# Patient Record
Sex: FEMALE | Race: WHITE
[De-identification: names, ages, dates, MRNs, and addresses within clinical notes are randomized per-mention and may not be internally consistent; named-entity substitution may affect disease eponyms.]

---

## 2017-10-31 ENCOUNTER — HOSPITAL ENCOUNTER (OUTPATIENT)
Dept: HOSPITAL 26 - MDS | Age: 67
Discharge: HOME | End: 2017-10-31
Attending: INTERNAL MEDICINE
Payer: COMMERCIAL

## 2017-10-31 VITALS — HEIGHT: 61 IN | WEIGHT: 134 LBS | BODY MASS INDEX: 25.3 KG/M2

## 2017-10-31 DIAGNOSIS — E07.9: ICD-10-CM

## 2017-10-31 DIAGNOSIS — Z79.899: ICD-10-CM

## 2017-10-31 DIAGNOSIS — Z98.890: ICD-10-CM

## 2017-10-31 DIAGNOSIS — E11.9: ICD-10-CM

## 2017-10-31 DIAGNOSIS — E66.3: ICD-10-CM

## 2017-10-31 DIAGNOSIS — K62.5: Primary | ICD-10-CM

## 2017-10-31 PROCEDURE — 45398 COLONOSCOPY W/BAND LIGATION: CPT

## 2017-10-31 PROCEDURE — 82948 REAGENT STRIP/BLOOD GLUCOSE: CPT

## 2018-10-08 ENCOUNTER — HOSPITAL ENCOUNTER (INPATIENT)
Dept: HOSPITAL 26 - MED | Age: 68
LOS: 3 days | Discharge: HOME | DRG: 393 | End: 2018-10-11
Attending: GENERAL PRACTICE | Admitting: GENERAL PRACTICE
Payer: COMMERCIAL

## 2018-10-08 VITALS — SYSTOLIC BLOOD PRESSURE: 170 MMHG | DIASTOLIC BLOOD PRESSURE: 59 MMHG

## 2018-10-08 VITALS — SYSTOLIC BLOOD PRESSURE: 165 MMHG | DIASTOLIC BLOOD PRESSURE: 65 MMHG

## 2018-10-08 VITALS — WEIGHT: 130 LBS | BODY MASS INDEX: 24.55 KG/M2 | HEIGHT: 61 IN

## 2018-10-08 DIAGNOSIS — Z90.49: ICD-10-CM

## 2018-10-08 DIAGNOSIS — N39.0: ICD-10-CM

## 2018-10-08 DIAGNOSIS — E83.51: ICD-10-CM

## 2018-10-08 DIAGNOSIS — N17.0: ICD-10-CM

## 2018-10-08 DIAGNOSIS — Z23: ICD-10-CM

## 2018-10-08 DIAGNOSIS — N18.6: ICD-10-CM

## 2018-10-08 DIAGNOSIS — D61.818: ICD-10-CM

## 2018-10-08 DIAGNOSIS — Z79.4: ICD-10-CM

## 2018-10-08 DIAGNOSIS — I50.43: ICD-10-CM

## 2018-10-08 DIAGNOSIS — Z82.49: ICD-10-CM

## 2018-10-08 DIAGNOSIS — Z79.899: ICD-10-CM

## 2018-10-08 DIAGNOSIS — Z87.891: ICD-10-CM

## 2018-10-08 DIAGNOSIS — K70.30: ICD-10-CM

## 2018-10-08 DIAGNOSIS — E03.9: ICD-10-CM

## 2018-10-08 DIAGNOSIS — I13.2: ICD-10-CM

## 2018-10-08 DIAGNOSIS — Z83.3: ICD-10-CM

## 2018-10-08 DIAGNOSIS — K64.9: Primary | ICD-10-CM

## 2018-10-08 DIAGNOSIS — E11.22: ICD-10-CM

## 2018-10-08 DIAGNOSIS — M81.0: ICD-10-CM

## 2018-10-08 DIAGNOSIS — K21.9: ICD-10-CM

## 2018-10-08 DIAGNOSIS — Z99.2: ICD-10-CM

## 2018-10-08 DIAGNOSIS — E83.39: ICD-10-CM

## 2018-10-08 DIAGNOSIS — K76.6: ICD-10-CM

## 2018-10-08 DIAGNOSIS — E11.65: ICD-10-CM

## 2018-10-08 LAB
ALBUMIN FLD-MCNC: 3.4 G/DL (ref 3.4–5)
AMYLASE SERPL-CCNC: 80 U/L (ref 25–115)
ANION GAP SERPL CALCULATED.3IONS-SCNC: 10.8 MMOL/L (ref 8–16)
AST SERPL-CCNC: 27 U/L (ref 15–37)
BASOPHILS # BLD AUTO: 0 K/UL (ref 0–0.22)
BASOPHILS NFR BLD AUTO: 1 % (ref 0–2)
BILIRUB SERPL-MCNC: 0.5 MG/DL (ref 0–1)
BUN SERPL-MCNC: 45 MG/DL (ref 7–18)
CHLORIDE SERPL-SCNC: 102 MMOL/L (ref 98–107)
CHOLEST/HDLC SERPL: 2.9 {RATIO} (ref 1–4.5)
CO2 SERPL-SCNC: 28.2 MMOL/L (ref 21–32)
CREAT SERPL-MCNC: 5.8 MG/DL (ref 0.6–1.3)
EOSINOPHIL # BLD AUTO: 0.1 K/UL (ref 0–0.4)
EOSINOPHIL NFR BLD AUTO: 5.3 % (ref 0–4)
ERYTHROCYTE [DISTWIDTH] IN BLOOD BY AUTOMATED COUNT: 18.6 % (ref 11.6–13.7)
GFR SERPL CREATININE-BSD FRML MDRD: 9 ML/MIN (ref 90–?)
GLUCOSE SERPL-MCNC: 313 MG/DL (ref 74–106)
HCT VFR BLD AUTO: 23.9 % (ref 36–48)
HDLC SERPL-MCNC: 60 MG/DL (ref 40–60)
HGB BLD-MCNC: 7.6 G/DL (ref 12–16)
LDH SERPL-CCNC: 224 U/L (ref 81–234)
LDLC SERPL CALC-MCNC: 95 MG/DL (ref 60–100)
LIPASE SERPL-CCNC: 240 U/L (ref 73–393)
LYMPHOCYTES # BLD AUTO: 0.2 K/UL (ref 2.5–16.5)
LYMPHOCYTES NFR BLD AUTO: 6.2 % (ref 20.5–51.1)
MAGNESIUM SERPL-MCNC: 2.6 MG/DL (ref 1.8–2.4)
MCH RBC QN AUTO: 28 PG (ref 27–31)
MCHC RBC AUTO-ENTMCNC: 32 G/DL (ref 33–37)
MCV RBC AUTO: 89.1 FL (ref 80–94)
MONOCYTES # BLD AUTO: 0.3 K/UL (ref 0.8–1)
MONOCYTES NFR BLD AUTO: 10.7 % (ref 1.7–9.3)
NEUTROPHILS # BLD AUTO: 2 K/UL (ref 1.8–7.7)
NEUTROPHILS NFR BLD AUTO: 76.8 % (ref 42.2–75.2)
PHOSPHATE SERPL-MCNC: 5.6 MG/DL (ref 2.5–4.9)
PLATELET # BLD AUTO: 54 K/UL (ref 140–450)
POTASSIUM SERPL-SCNC: 4 MMOL/L (ref 3.5–5.1)
PROTHROMBIN TIME: 10.5 SECS (ref 10.8–13.4)
RBC # BLD AUTO: 2.68 MIL/UL (ref 4.2–5.4)
SODIUM SERPL-SCNC: 137 MMOL/L (ref 136–145)
TRIGL SERPL-MCNC: 86 MG/DL (ref 30–150)
TSH SERPL DL<=0.05 MIU/L-ACNC: 7.07 UIU/ML (ref 0.34–3.74)
WBC # BLD AUTO: 2.6 K/UL (ref 4.8–10.8)

## 2018-10-08 PROCEDURE — C1758 CATHETER, URETERAL: HCPCS

## 2018-10-08 PROCEDURE — P9035 PLATELET PHERES LEUKOREDUCED: HCPCS

## 2018-10-08 PROCEDURE — G0482 DRUG TEST DEF 15-21 CLASSES: HCPCS

## 2018-10-08 RX ADMIN — INSULIN LISPRO PRN UNITS: 100 INJECTION, SOLUTION INTRAVENOUS; SUBCUTANEOUS at 21:49

## 2018-10-08 RX ADMIN — DEXTROSE AND SODIUM CHLORIDE SCH MLS/HR: 5; .9 INJECTION, SOLUTION INTRAVENOUS at 21:00

## 2018-10-08 RX ADMIN — CAPTOPRIL SCH MG: 25 TABLET ORAL at 21:57

## 2018-10-08 RX ADMIN — PROPRANOLOL HYDROCHLORIDE SCH MG: 20 TABLET ORAL at 21:59

## 2018-10-08 RX ADMIN — Medication SCH DEV: at 21:56

## 2018-10-08 RX ADMIN — FERROUS SULFATE TAB 325 MG (65 MG ELEMENTAL FE) SCH MG: 325 (65 FE) TAB at 21:58

## 2018-10-08 RX ADMIN — Medication SCH MG: at 21:57

## 2018-10-08 NOTE — NUR
RECEIVED PT FROM KAYLEE RN PT Persian SPEAKER AAOX4 AMBULATES WITH ASSISTANT ON TELMETRY SR HL 
ON RT WRIST PATENT,  MRSA NARES PROTOCOL TAKEN AND SENT TO LAB RELATIVES AT BED SIDE  NOT 
ACTIVE BLEEDIG FROM RECTUM AT THIS TIME INITIAL ASSESSMENT DONE

## 2018-10-08 NOTE — NUR
Patient will be admitted to care of DR. UGALDE. Admited to TELE.  Will go to 
room 124B. Belongings list completed.  Report to RICHY SHEIKH.

## 2018-10-09 VITALS — SYSTOLIC BLOOD PRESSURE: 140 MMHG | DIASTOLIC BLOOD PRESSURE: 59 MMHG

## 2018-10-09 VITALS — DIASTOLIC BLOOD PRESSURE: 58 MMHG | SYSTOLIC BLOOD PRESSURE: 146 MMHG

## 2018-10-09 VITALS — DIASTOLIC BLOOD PRESSURE: 53 MMHG | SYSTOLIC BLOOD PRESSURE: 146 MMHG

## 2018-10-09 VITALS — DIASTOLIC BLOOD PRESSURE: 46 MMHG | SYSTOLIC BLOOD PRESSURE: 138 MMHG

## 2018-10-09 VITALS — SYSTOLIC BLOOD PRESSURE: 144 MMHG | DIASTOLIC BLOOD PRESSURE: 54 MMHG

## 2018-10-09 VITALS — SYSTOLIC BLOOD PRESSURE: 143 MMHG | DIASTOLIC BLOOD PRESSURE: 48 MMHG

## 2018-10-09 LAB
AMORPH SED URNS QL MICRO: (no result) /HPF
ANION GAP SERPL CALCULATED.3IONS-SCNC: 13.9 MMOL/L (ref 8–16)
APPEARANCE UR: CLEAR
BASOPHILS # BLD AUTO: 0 K/UL (ref 0–0.22)
BASOPHILS # BLD AUTO: 0 K/UL (ref 0–0.22)
BASOPHILS NFR BLD AUTO: 0.8 % (ref 0–2)
BASOPHILS NFR BLD AUTO: 1 % (ref 0–2)
BILIRUB UR QL STRIP: NEGATIVE
BUN SERPL-MCNC: 55 MG/DL (ref 7–18)
CHLORIDE SERPL-SCNC: 104 MMOL/L (ref 98–107)
CO2 SERPL-SCNC: 27.4 MMOL/L (ref 21–32)
COLOR UR: YELLOW
CREAT SERPL-MCNC: 6.6 MG/DL (ref 0.6–1.3)
EOSINOPHIL # BLD AUTO: 0.1 K/UL (ref 0–0.4)
EOSINOPHIL # BLD AUTO: 0.2 K/UL (ref 0–0.4)
EOSINOPHIL NFR BLD AUTO: 4.5 % (ref 0–4)
EOSINOPHIL NFR BLD AUTO: 6.4 % (ref 0–4)
ERYTHROCYTE [DISTWIDTH] IN BLOOD BY AUTOMATED COUNT: 18.7 % (ref 11.6–13.7)
ERYTHROCYTE [DISTWIDTH] IN BLOOD BY AUTOMATED COUNT: 18.9 % (ref 11.6–13.7)
GFR SERPL CREATININE-BSD FRML MDRD: 8 ML/MIN (ref 90–?)
GLUCOSE SERPL-MCNC: 163 MG/DL (ref 74–106)
GLUCOSE UR STRIP-MCNC: (no result) MG/DL
HCT VFR BLD AUTO: 22.5 % (ref 36–48)
HCT VFR BLD AUTO: 23 % (ref 36–48)
HGB BLD-MCNC: 7.3 G/DL (ref 12–16)
HGB BLD-MCNC: 7.3 G/DL (ref 12–16)
HGB UR QL STRIP: (no result)
IRON SERPL-MCNC: 56 UG/DL (ref 35–150)
LEUKOCYTE ESTERASE UR QL STRIP: (no result)
LYMPHOCYTES # BLD AUTO: 0.2 K/UL (ref 2.5–16.5)
LYMPHOCYTES # BLD AUTO: 0.2 K/UL (ref 2.5–16.5)
LYMPHOCYTES NFR BLD AUTO: 7.3 % (ref 20.5–51.1)
LYMPHOCYTES NFR BLD AUTO: 7.3 % (ref 20.5–51.1)
MAGNESIUM SERPL-MCNC: 2.4 MG/DL (ref 1.8–2.4)
MCH RBC QN AUTO: 29 PG (ref 27–31)
MCH RBC QN AUTO: 29 PG (ref 27–31)
MCHC RBC AUTO-ENTMCNC: 32 G/DL (ref 33–37)
MCHC RBC AUTO-ENTMCNC: 33 G/DL (ref 33–37)
MCV RBC AUTO: 89.7 FL (ref 80–94)
MCV RBC AUTO: 90.4 FL (ref 80–94)
MONOCYTES # BLD AUTO: 0.3 K/UL (ref 0.8–1)
MONOCYTES # BLD AUTO: 0.3 K/UL (ref 0.8–1)
MONOCYTES NFR BLD AUTO: 10.2 % (ref 1.7–9.3)
MONOCYTES NFR BLD AUTO: 13.2 % (ref 1.7–9.3)
NEUTROPHILS # BLD AUTO: 1.9 K/UL (ref 1.8–7.7)
NEUTROPHILS # BLD AUTO: 2.4 K/UL (ref 1.8–7.7)
NEUTROPHILS NFR BLD AUTO: 72.1 % (ref 42.2–75.2)
NEUTROPHILS NFR BLD AUTO: 77.2 % (ref 42.2–75.2)
NITRITE UR QL STRIP: NEGATIVE
PH UR STRIP: 6 [PH] (ref 5–9)
PHOSPHATE SERPL-MCNC: 6.8 MG/DL (ref 2.5–4.9)
PLATELET # BLD AUTO: 51 K/UL (ref 140–450)
PLATELET # BLD AUTO: 58 K/UL (ref 140–450)
POTASSIUM SERPL-SCNC: 4.3 MMOL/L (ref 3.5–5.1)
RBC # BLD AUTO: 2.5 MIL/UL (ref 4.2–5.4)
RBC # BLD AUTO: 2.55 MIL/UL (ref 4.2–5.4)
RBC #/AREA URNS HPF: (no result) /HPF (ref 0–5)
SODIUM SERPL-SCNC: 141 MMOL/L (ref 136–145)
TIBC SERPL-MCNC: 231 UG/DL (ref 250–450)
WBC # BLD AUTO: 2.6 K/UL (ref 4.8–10.8)
WBC # BLD AUTO: 3.1 K/UL (ref 4.8–10.8)
WBC,URINE: (no result) /HPF (ref 0–5)

## 2018-10-09 PROCEDURE — 30233R1 TRANSFUSION OF NONAUTOLOGOUS PLATELETS INTO PERIPHERAL VEIN, PERCUTANEOUS APPROACH: ICD-10-PCS | Performed by: GENERAL PRACTICE

## 2018-10-09 RX ADMIN — CALCIUM SCH MG: 500 TABLET ORAL at 09:03

## 2018-10-09 RX ADMIN — Medication SCH TAB: at 09:04

## 2018-10-09 RX ADMIN — FERROUS SULFATE TAB 325 MG (65 MG ELEMENTAL FE) SCH MG: 325 (65 FE) TAB at 09:00

## 2018-10-09 RX ADMIN — LEVOTHYROXINE SODIUM SCH MG: 50 TABLET ORAL at 06:19

## 2018-10-09 RX ADMIN — PROPRANOLOL HYDROCHLORIDE SCH MG: 20 TABLET ORAL at 09:00

## 2018-10-09 RX ADMIN — CAPTOPRIL SCH MG: 25 TABLET ORAL at 09:03

## 2018-10-09 RX ADMIN — Medication SCH DEV: at 12:21

## 2018-10-09 RX ADMIN — CAPTOPRIL SCH MG: 25 TABLET ORAL at 21:13

## 2018-10-09 RX ADMIN — PROPRANOLOL HYDROCHLORIDE SCH MG: 20 TABLET ORAL at 21:14

## 2018-10-09 RX ADMIN — DEXTROSE AND SODIUM CHLORIDE SCH MLS/HR: 5; .9 INJECTION, SOLUTION INTRAVENOUS at 21:00

## 2018-10-09 RX ADMIN — Medication SCH DEV: at 21:04

## 2018-10-09 RX ADMIN — Medication SCH MG: at 21:14

## 2018-10-09 RX ADMIN — Medication SCH DEV: at 16:30

## 2018-10-09 RX ADMIN — INSULIN LISPRO PRN UNITS: 100 INJECTION, SOLUTION INTRAVENOUS; SUBCUTANEOUS at 12:21

## 2018-10-09 RX ADMIN — Medication SCH MG: at 09:00

## 2018-10-09 RX ADMIN — FERROUS SULFATE TAB 325 MG (65 MG ELEMENTAL FE) SCH MG: 325 (65 FE) TAB at 21:15

## 2018-10-09 RX ADMIN — Medication SCH DEV: at 06:03

## 2018-10-09 RX ADMIN — INSULIN LISPRO PRN UNITS: 100 INJECTION, SOLUTION INTRAVENOUS; SUBCUTANEOUS at 21:07

## 2018-10-09 NOTE — NUR
AM MEDS GIVEN, PT JACKSON PILLS WELL, PROPRANOLOL HELD FOR DECREASED HR 58.  PT DENIES ANY 
NEEDS, CALL BELL WITHIN REACH, SIDE RAILS UP, WILL CONTINUE TO MONITOR.  PT AWARE OF NEED 
FOR URINE SAMPLE.  CUP AT BEDSIDE.

## 2018-10-09 NOTE — NUR
PATIENT HAS BEEN SCREENED AND CATEGORIZED AS MODERATE NUTRITION RISK. PATIENT WILL BE SEEN 
WITHIN 3-5 DAYS OF ADMISSION.



10/11/18  10/13/18



BRYAN LYNN RD

## 2018-10-09 NOTE — NUR
REPORT RECEIVED FROM NIGHT SHIFT NURSE, PT SLEEPING QUIETLY IN BED IN NAD, RESP EVEN 
UNLABORED, SKIN WARM DRY COLOR WNL,  AROUSES EASILY TO VOICE, DENIES PAIN OR DISCOMFORT, 
PLAN OF CARE REVIEWED, NO NEEDS AT THIS TIME, ALL SAFETY MEASURES IN PLACE, WILL CONTINUE TO 
MONITOR.

## 2018-10-09 NOTE — NUR
PLATELET TRANSFUSION COMPLETED, NO S/S OF REACTIONS NOTED, PT JACKSON DINNER WELL, NO N/V, 
REPORTS ONLY OCCASIONAL MINIMAL RECTAL BLEED, NO ACTIVE BLEEDING NOTED AT THIS TIME.

## 2018-10-09 NOTE — NUR
PLATELET TRANSFUSION STARTED AT THIS TIME, UNIT VERIFIED WITH 2 RNS, WILL MONITOR CLOSELY 
FOR REACTIONS.

## 2018-10-09 NOTE — NUR
RECEIVED PT FROM KRISS RN PT AAOX4 Frisian SPEAKER AMBULATES WITH ASSISTANCE  DENIES ANY PAIN 
OR DISCOMFORT ON TELEMETRY SR  IV ON RT WRIST INFUSING WELL  ,DIALISYS ACCESS ONL EFT UA 
RELATIVES AT BED SIDE INITIAL ASSESSMENT DONE

## 2018-10-09 NOTE — NUR
2 UNITS INSULIN GIVEN FOR . DAUGHTER AT BEDSIDE, DENIES ANY IMMEDIATE NEEDS WILL 
CONTINUE TO MONITOR

## 2018-10-10 VITALS — SYSTOLIC BLOOD PRESSURE: 148 MMHG | DIASTOLIC BLOOD PRESSURE: 83 MMHG

## 2018-10-10 VITALS — DIASTOLIC BLOOD PRESSURE: 60 MMHG | SYSTOLIC BLOOD PRESSURE: 175 MMHG

## 2018-10-10 VITALS — DIASTOLIC BLOOD PRESSURE: 67 MMHG | SYSTOLIC BLOOD PRESSURE: 156 MMHG

## 2018-10-10 VITALS — DIASTOLIC BLOOD PRESSURE: 62 MMHG | SYSTOLIC BLOOD PRESSURE: 121 MMHG

## 2018-10-10 VITALS — DIASTOLIC BLOOD PRESSURE: 57 MMHG | SYSTOLIC BLOOD PRESSURE: 129 MMHG

## 2018-10-10 VITALS — SYSTOLIC BLOOD PRESSURE: 141 MMHG | DIASTOLIC BLOOD PRESSURE: 58 MMHG

## 2018-10-10 LAB
ANION GAP SERPL CALCULATED.3IONS-SCNC: 15 MMOL/L (ref 8–16)
BASOPHILS # BLD AUTO: 0 K/UL (ref 0–0.22)
BASOPHILS NFR BLD AUTO: 1.1 % (ref 0–2)
BUN SERPL-MCNC: 69 MG/DL (ref 7–18)
CHLORIDE SERPL-SCNC: 105 MMOL/L (ref 98–107)
CO2 SERPL-SCNC: 24.1 MMOL/L (ref 21–32)
CREAT SERPL-MCNC: 7.9 MG/DL (ref 0.6–1.3)
EOSINOPHIL # BLD AUTO: 0.2 K/UL (ref 0–0.4)
EOSINOPHIL NFR BLD AUTO: 6.8 % (ref 0–4)
ERYTHROCYTE [DISTWIDTH] IN BLOOD BY AUTOMATED COUNT: 18.7 % (ref 11.6–13.7)
FOLATE SERPL-MCNC: 15.5 NG/ML (ref 3–?)
GFR SERPL CREATININE-BSD FRML MDRD: 7 ML/MIN (ref 90–?)
GLUCOSE SERPL-MCNC: 110 MG/DL (ref 74–106)
HCT VFR BLD AUTO: 23.2 % (ref 36–48)
HGB BLD-MCNC: 7.5 G/DL (ref 12–16)
LYMPHOCYTES # BLD AUTO: 0.3 K/UL (ref 2.5–16.5)
LYMPHOCYTES NFR BLD AUTO: 8 % (ref 20.5–51.1)
MAGNESIUM SERPL-MCNC: 2.6 MG/DL (ref 1.8–2.4)
MCH RBC QN AUTO: 29 PG (ref 27–31)
MCHC RBC AUTO-ENTMCNC: 32 G/DL (ref 33–37)
MCV RBC AUTO: 89.5 FL (ref 80–94)
MONOCYTES # BLD AUTO: 0.4 K/UL (ref 0.8–1)
MONOCYTES NFR BLD AUTO: 12.6 % (ref 1.7–9.3)
NEUTROPHILS # BLD AUTO: 2.3 K/UL (ref 1.8–7.7)
NEUTROPHILS NFR BLD AUTO: 71.5 % (ref 42.2–75.2)
PHOSPHATE SERPL-MCNC: 7.3 MG/DL (ref 2.5–4.9)
PLATELET # BLD AUTO: 61 K/UL (ref 140–450)
POTASSIUM SERPL-SCNC: 5.1 MMOL/L (ref 3.5–5.1)
RBC # BLD AUTO: 2.59 MIL/UL (ref 4.2–5.4)
SODIUM SERPL-SCNC: 139 MMOL/L (ref 136–145)
T4 SERPL-MCNC: 8.4 UG/DL (ref 4.5–12)
TRANSFERRIN SERPL-MCNC: 203 MG/DL (ref 200–370)
VIT B12 SERPL-MCNC: > 1999 PG/ML (ref 232–1245)
WBC # BLD AUTO: 3.3 K/UL (ref 4.8–10.8)

## 2018-10-10 PROCEDURE — 3E0234Z INTRODUCTION OF SERUM, TOXOID AND VACCINE INTO MUSCLE, PERCUTANEOUS APPROACH: ICD-10-PCS | Performed by: GENERAL PRACTICE

## 2018-10-10 PROCEDURE — 5A1D70Z PERFORMANCE OF URINARY FILTRATION, INTERMITTENT, LESS THAN 6 HOURS PER DAY: ICD-10-PCS | Performed by: INTERNAL MEDICINE

## 2018-10-10 RX ADMIN — INSULIN LISPRO PRN UNITS: 100 INJECTION, SOLUTION INTRAVENOUS; SUBCUTANEOUS at 17:13

## 2018-10-10 RX ADMIN — Medication SCH TAB: at 09:12

## 2018-10-10 RX ADMIN — Medication SCH DEV: at 21:00

## 2018-10-10 RX ADMIN — CALCIUM ACETATE SCH MG: 667 CAPSULE ORAL at 17:14

## 2018-10-10 RX ADMIN — CALCIUM SCH MG: 500 TABLET ORAL at 09:13

## 2018-10-10 RX ADMIN — CAPTOPRIL SCH MG: 25 TABLET ORAL at 09:00

## 2018-10-10 RX ADMIN — INSULIN LISPRO PRN UNITS: 100 INJECTION, SOLUTION INTRAVENOUS; SUBCUTANEOUS at 12:43

## 2018-10-10 RX ADMIN — PROPRANOLOL HYDROCHLORIDE SCH MG: 20 TABLET ORAL at 09:00

## 2018-10-10 RX ADMIN — Medication SCH MG: at 20:36

## 2018-10-10 RX ADMIN — FAMOTIDINE SCH MG: 20 TABLET ORAL at 09:14

## 2018-10-10 RX ADMIN — Medication SCH DEV: at 17:14

## 2018-10-10 RX ADMIN — CALCIUM ACETATE SCH MG: 667 CAPSULE ORAL at 11:26

## 2018-10-10 RX ADMIN — PROPRANOLOL HYDROCHLORIDE SCH MG: 20 TABLET ORAL at 20:37

## 2018-10-10 RX ADMIN — FERROUS SULFATE TAB 325 MG (65 MG ELEMENTAL FE) SCH MG: 325 (65 FE) TAB at 21:03

## 2018-10-10 RX ADMIN — Medication SCH DEV: at 06:34

## 2018-10-10 RX ADMIN — Medication SCH DEV: at 11:30

## 2018-10-10 RX ADMIN — LEVOTHYROXINE SODIUM SCH MG: 50 TABLET ORAL at 06:33

## 2018-10-10 RX ADMIN — FERROUS SULFATE TAB 325 MG (65 MG ELEMENTAL FE) SCH MG: 325 (65 FE) TAB at 09:12

## 2018-10-10 RX ADMIN — Medication SCH MG: at 09:12

## 2018-10-10 RX ADMIN — DEXTROSE AND SODIUM CHLORIDE SCH MLS/HR: 5; .9 INJECTION, SOLUTION INTRAVENOUS at 16:41

## 2018-10-10 RX ADMIN — INSULIN GLARGINE SCH UNITS: 100 INJECTION, SOLUTION SUBCUTANEOUS at 09:15

## 2018-10-10 RX ADMIN — INSULIN LISPRO PRN UNITS: 100 INJECTION, SOLUTION INTRAVENOUS; SUBCUTANEOUS at 21:03

## 2018-10-10 RX ADMIN — CAPTOPRIL SCH MG: 25 TABLET ORAL at 20:37

## 2018-10-10 NOTE — NUR
DAUGHTER AT BEDSIDE AT THIS TIME. EXPLAINED PLANS OF CARE FOR THE NIGHT WITH HER.  DAUGHTER 
EXPLAINED WELL TO PT. NO COMPLAINTS DONE.

## 2018-10-10 NOTE — NUR
REPORT RECEIVED FROM NIGHT SHIFT NURSE, PT SLEEPING QUIETLY IN NAD, RESP EVEN UNLABORED ON 
RA, SKIN WARM DRY COLOR WNL, PT REMAINS ON CARDIAC MONITOR, AROUSES EASILY, DENIES PAIN OR 
DISCOMFORT, POC REVIEWED, ALL SAFETY MEASURES IN PLACE, WILL CONTINUE TO MONITOR.

## 2018-10-10 NOTE — NUR
PT.S FAMILY MEMBERS IN HERE VISITING. NO COMPLAINTS DONE. PT. IS BEING FED BY VISITORS. PT. 
EATING WELL AND SMILING. DENIES DOLOR. RE-ORIENTED HER FAMILY MEMBER ABOUT THE USE OF CALL 
LIGHT AND RE-EXPLAINED TO HER BY FAMILY IN English. "OK" .

## 2018-10-10 NOTE — NUR
RECEIVED REPORT FROM AM RN IN BED SLEEPING. WOKE UP EASILY WHEN TOUCHED. AFEBRILE. VITAL 
SIGNS TAKEN. NO SOB. NO DELOR PER PT. IVF SITE TO RIGHT FOREARM INTACT AND WITH GOOD BLOOD 
RETURN. HEMODIALYSIS DONE TODAY. LEFT AV SHUNT INTACT DRESSING AND NO NOTED BLEEDING. 
TELEMETRY MONITORING. CALL LIGHT WITH IN REACH. EXPLAINED TO HER TO USE IT IN CASE SHE NEEDS 
SOMETHING. BED ALARM ON.

## 2018-10-11 VITALS — DIASTOLIC BLOOD PRESSURE: 69 MMHG | SYSTOLIC BLOOD PRESSURE: 171 MMHG

## 2018-10-11 VITALS — DIASTOLIC BLOOD PRESSURE: 57 MMHG | SYSTOLIC BLOOD PRESSURE: 160 MMHG

## 2018-10-11 VITALS — DIASTOLIC BLOOD PRESSURE: 65 MMHG | SYSTOLIC BLOOD PRESSURE: 119 MMHG

## 2018-10-11 LAB
ANION GAP SERPL CALCULATED.3IONS-SCNC: 13 MMOL/L (ref 8–16)
BASOPHILS # BLD AUTO: 0 K/UL (ref 0–0.22)
BASOPHILS NFR BLD AUTO: 0.4 % (ref 0–2)
BUN SERPL-MCNC: 44 MG/DL (ref 7–18)
CHLORIDE SERPL-SCNC: 104 MMOL/L (ref 98–107)
CO2 SERPL-SCNC: 27.8 MMOL/L (ref 21–32)
CREAT SERPL-MCNC: 6.1 MG/DL (ref 0.6–1.3)
EOSINOPHIL # BLD AUTO: 0.2 K/UL (ref 0–0.4)
EOSINOPHIL NFR BLD AUTO: 6.9 % (ref 0–4)
ERYTHROCYTE [DISTWIDTH] IN BLOOD BY AUTOMATED COUNT: 17.6 % (ref 11.6–13.7)
GFR SERPL CREATININE-BSD FRML MDRD: 9 ML/MIN (ref 90–?)
GLUCOSE SERPL-MCNC: 134 MG/DL (ref 74–106)
HCT VFR BLD AUTO: 24.1 % (ref 36–48)
HGB BLD-MCNC: 7.7 G/DL (ref 12–16)
LYMPHOCYTES # BLD AUTO: 0.3 K/UL (ref 2.5–16.5)
LYMPHOCYTES NFR BLD AUTO: 12.7 % (ref 20.5–51.1)
MAGNESIUM SERPL-MCNC: 2.2 MG/DL (ref 1.8–2.4)
MCH RBC QN AUTO: 29 PG (ref 27–31)
MCHC RBC AUTO-ENTMCNC: 32 G/DL (ref 33–37)
MCV RBC AUTO: 90.1 FL (ref 80–94)
MONOCYTES # BLD AUTO: 0.3 K/UL (ref 0.8–1)
MONOCYTES NFR BLD AUTO: 12.9 % (ref 1.7–9.3)
NEUTROPHILS # BLD AUTO: 1.8 K/UL (ref 1.8–7.7)
NEUTROPHILS NFR BLD AUTO: 67.1 % (ref 42.2–75.2)
PHOSPHATE SERPL-MCNC: 6 MG/DL (ref 2.5–4.9)
PLATELET # BLD AUTO: 62 K/UL (ref 140–450)
POTASSIUM SERPL-SCNC: 3.8 MMOL/L (ref 3.5–5.1)
RBC # BLD AUTO: 2.67 MIL/UL (ref 4.2–5.4)
SODIUM SERPL-SCNC: 141 MMOL/L (ref 136–145)
WBC # BLD AUTO: 2.6 K/UL (ref 4.8–10.8)

## 2018-10-11 RX ADMIN — LEVOTHYROXINE SODIUM SCH MG: 50 TABLET ORAL at 06:46

## 2018-10-11 RX ADMIN — INSULIN GLARGINE SCH UNITS: 100 INJECTION, SOLUTION SUBCUTANEOUS at 08:28

## 2018-10-11 RX ADMIN — FERROUS SULFATE TAB 325 MG (65 MG ELEMENTAL FE) SCH MG: 325 (65 FE) TAB at 08:24

## 2018-10-11 RX ADMIN — CALCIUM SCH MG: 500 TABLET ORAL at 08:25

## 2018-10-11 RX ADMIN — CAPTOPRIL SCH MG: 25 TABLET ORAL at 08:24

## 2018-10-11 RX ADMIN — CALCIUM ACETATE SCH MG: 667 CAPSULE ORAL at 08:25

## 2018-10-11 RX ADMIN — INSULIN LISPRO PRN UNITS: 100 INJECTION, SOLUTION INTRAVENOUS; SUBCUTANEOUS at 12:17

## 2018-10-11 RX ADMIN — Medication SCH DEV: at 12:23

## 2018-10-11 RX ADMIN — FAMOTIDINE SCH MG: 20 TABLET ORAL at 08:24

## 2018-10-11 RX ADMIN — PROPRANOLOL HYDROCHLORIDE SCH MG: 20 TABLET ORAL at 08:25

## 2018-10-11 RX ADMIN — Medication SCH DEV: at 05:21

## 2018-10-11 RX ADMIN — Medication SCH MG: at 08:25

## 2018-10-11 RX ADMIN — Medication SCH TAB: at 08:24

## 2018-10-11 NOTE — NUR
RECEIVED BEDSIDE REPORT FROM PM SHIFT NURSE. PT AWAKE, VERBAL, RESPIRATIONS EVEN & 
UNLABORED. CALL LIGHT WITHIN REACH.

## 2018-10-11 NOTE — NUR
PT SITTING UP IN BED, ALERT, VERBAL, DENIES ANY PAIN, NO SIGNS OF DISTRESS. RIGHT ARM IV 
SITE INTACT & ASYMPTOMATIC. CALL LIGHT WITHIN REACH.

## 2018-10-11 NOTE — NUR
SPOKE TO JAN (DAUGHTER) ON THE PHONE RE: DISCHARGE ORDER. PER JAN, SHE WILL COME TO  
PT VIA PRIVATE CAR. PT SITTING UP IN BED, WATCHING TV, NO SIGNS OF DISTRESS, RESPIRATIONS 
EVEN & UNLABORED. CALL LIGHT WITHIN REACH.

## 2018-10-11 NOTE — NUR
PT'S DAUGHTER JAN ARRIVED, WENT OVER DISCHARGE INSTRUCTIONS WITH PT & DAUGHTER. ALL 
BELONGINGS WITH JAN. ACCOMPANIED PT IN WHEELCHAIR TO FRONT LOBBY. PT ABLE TO AMB FROM 
WHEELCHAIR TO CAR WITH STEADY GAIT. PT STABLE UPON DISCHARGE.

## 2018-10-11 NOTE — NUR
PT. AWAKE AT THIS TIME. ENDORSED TO THE AM RN FOR CONTINUITY OF CARE. NO COMPLAINTS DONE. 
SLEPT WELL THIS SHIFT. ON TELEMETRY MONITORING. NEEDS ANTICIPATED AND MET.

## 2018-10-11 NOTE — NUR
SLEEPING AT THIS TIME. WOKE UP EASILY WHEN VITAL SIGNS WERE TAKEN. DENIES PAIN. "NO DOLOR". 
NO RESTLESSNESS NOTED. CALL LIGHT WITHIN REACH.

## 2019-01-28 ENCOUNTER — HOSPITAL ENCOUNTER (INPATIENT)
Dept: HOSPITAL 26 - MED | Age: 69
LOS: 4 days | Discharge: NURSING, REHAB OR LONG-TERM CARE | DRG: 280 | End: 2019-02-01
Attending: GENERAL PRACTICE | Admitting: GENERAL PRACTICE
Payer: COMMERCIAL

## 2019-01-28 VITALS — SYSTOLIC BLOOD PRESSURE: 186 MMHG | DIASTOLIC BLOOD PRESSURE: 81 MMHG

## 2019-01-28 VITALS — BODY MASS INDEX: 26.13 KG/M2 | HEIGHT: 62 IN | WEIGHT: 142 LBS

## 2019-01-28 VITALS — DIASTOLIC BLOOD PRESSURE: 61 MMHG | SYSTOLIC BLOOD PRESSURE: 157 MMHG

## 2019-01-28 VITALS — DIASTOLIC BLOOD PRESSURE: 56 MMHG | SYSTOLIC BLOOD PRESSURE: 155 MMHG

## 2019-01-28 VITALS — SYSTOLIC BLOOD PRESSURE: 162 MMHG | DIASTOLIC BLOOD PRESSURE: 66 MMHG

## 2019-01-28 VITALS — SYSTOLIC BLOOD PRESSURE: 133 MMHG | DIASTOLIC BLOOD PRESSURE: 51 MMHG

## 2019-01-28 DIAGNOSIS — E03.9: ICD-10-CM

## 2019-01-28 DIAGNOSIS — E83.51: ICD-10-CM

## 2019-01-28 DIAGNOSIS — E11.22: ICD-10-CM

## 2019-01-28 DIAGNOSIS — E11.65: ICD-10-CM

## 2019-01-28 DIAGNOSIS — D61.818: ICD-10-CM

## 2019-01-28 DIAGNOSIS — D63.8: ICD-10-CM

## 2019-01-28 DIAGNOSIS — I27.21: ICD-10-CM

## 2019-01-28 DIAGNOSIS — Z99.2: ICD-10-CM

## 2019-01-28 DIAGNOSIS — I21.A1: Primary | ICD-10-CM

## 2019-01-28 DIAGNOSIS — M81.0: ICD-10-CM

## 2019-01-28 DIAGNOSIS — Z90.49: ICD-10-CM

## 2019-01-28 DIAGNOSIS — I50.43: ICD-10-CM

## 2019-01-28 DIAGNOSIS — M60.9: ICD-10-CM

## 2019-01-28 DIAGNOSIS — K64.4: ICD-10-CM

## 2019-01-28 DIAGNOSIS — N18.6: ICD-10-CM

## 2019-01-28 DIAGNOSIS — D72.819: ICD-10-CM

## 2019-01-28 DIAGNOSIS — E87.1: ICD-10-CM

## 2019-01-28 DIAGNOSIS — I13.2: ICD-10-CM

## 2019-01-28 DIAGNOSIS — Z79.1: ICD-10-CM

## 2019-01-28 DIAGNOSIS — M94.0: ICD-10-CM

## 2019-01-28 DIAGNOSIS — J98.11: ICD-10-CM

## 2019-01-28 DIAGNOSIS — Z98.51: ICD-10-CM

## 2019-01-28 DIAGNOSIS — Z87.891: ICD-10-CM

## 2019-01-28 DIAGNOSIS — N17.0: ICD-10-CM

## 2019-01-28 DIAGNOSIS — I08.1: ICD-10-CM

## 2019-01-28 DIAGNOSIS — Z79.899: ICD-10-CM

## 2019-01-28 DIAGNOSIS — Z83.3: ICD-10-CM

## 2019-01-28 DIAGNOSIS — K21.9: ICD-10-CM

## 2019-01-28 DIAGNOSIS — F41.9: ICD-10-CM

## 2019-01-28 DIAGNOSIS — Z79.4: ICD-10-CM

## 2019-01-28 DIAGNOSIS — K74.60: ICD-10-CM

## 2019-01-28 DIAGNOSIS — I67.4: ICD-10-CM

## 2019-01-28 DIAGNOSIS — Z82.49: ICD-10-CM

## 2019-01-28 LAB
ALBUMIN FLD-MCNC: 3.4 G/DL (ref 3.4–5)
ANION GAP SERPL CALCULATED.3IONS-SCNC: 13.2 MMOL/L (ref 8–16)
AST SERPL-CCNC: 40 U/L (ref 15–37)
BASOPHILS # BLD AUTO: 0 K/UL (ref 0–0.22)
BASOPHILS NFR BLD AUTO: 0.4 % (ref 0–2)
BILIRUB SERPL-MCNC: 1 MG/DL (ref 0–1)
BUN SERPL-MCNC: 16 MG/DL (ref 7–18)
CHLORIDE SERPL-SCNC: 96 MMOL/L (ref 98–107)
CHOLEST/HDLC SERPL: 2.6 {RATIO} (ref 1–4.5)
CO2 SERPL-SCNC: 28.9 MMOL/L (ref 21–32)
CREAT SERPL-MCNC: 4.3 MG/DL (ref 0.6–1.3)
EOSINOPHIL # BLD AUTO: 0.1 K/UL (ref 0–0.4)
EOSINOPHIL NFR BLD AUTO: 2.6 % (ref 0–4)
ERYTHROCYTE [DISTWIDTH] IN BLOOD BY AUTOMATED COUNT: 19.7 % (ref 11.6–13.7)
GFR SERPL CREATININE-BSD FRML MDRD: 13 ML/MIN (ref 90–?)
GLUCOSE SERPL-MCNC: 301 MG/DL (ref 74–106)
HCT VFR BLD AUTO: 30.7 % (ref 36–48)
HDLC SERPL-MCNC: 70 MG/DL (ref 40–60)
HGB BLD-MCNC: 9.7 G/DL (ref 12–16)
LDLC SERPL CALC-MCNC: 95 MG/DL (ref 60–100)
LIPASE SERPL-CCNC: 190 U/L (ref 73–393)
LYMPHOCYTES # BLD AUTO: 0 K/UL (ref 2.5–16.5)
LYMPHOCYTES NFR BLD AUTO: 1.2 % (ref 20.5–51.1)
MAGNESIUM SERPL-MCNC: 2 MG/DL (ref 1.8–2.4)
MCH RBC QN AUTO: 29 PG (ref 27–31)
MCHC RBC AUTO-ENTMCNC: 32 G/DL (ref 33–37)
MCV RBC AUTO: 91.2 FL (ref 80–94)
MONOCYTES # BLD AUTO: 0 K/UL (ref 0.8–1)
MONOCYTES NFR BLD AUTO: 1.2 % (ref 1.7–9.3)
NEUTROPHILS # BLD AUTO: 3.7 K/UL (ref 1.8–7.7)
NEUTROPHILS NFR BLD AUTO: 94.6 % (ref 42.2–75.2)
PHOSPHATE SERPL-MCNC: 4.5 MG/DL (ref 2.5–4.9)
PLATELET # BLD AUTO: 59 K/UL (ref 140–450)
POTASSIUM SERPL-SCNC: 4.1 MMOL/L (ref 3.5–5.1)
PROTHROMBIN TIME: 11.6 SECS (ref 10.8–13.4)
RBC # BLD AUTO: 3.36 MIL/UL (ref 4.2–5.4)
SODIUM SERPL-SCNC: 134 MMOL/L (ref 136–145)
TRIGL SERPL-MCNC: 103 MG/DL (ref 30–150)
TSH SERPL DL<=0.05 MIU/L-ACNC: 3.46 UIU/ML (ref 0.34–3.74)
WBC # BLD AUTO: 3.9 K/UL (ref 4.8–10.8)

## 2019-01-28 PROCEDURE — C9113 INJ PANTOPRAZOLE SODIUM, VIA: HCPCS

## 2019-01-28 RX ADMIN — DEXTROSE AND SODIUM CHLORIDE SCH MLS/HR: 5; .9 INJECTION, SOLUTION INTRAVENOUS at 23:30

## 2019-01-28 RX ADMIN — DEXTROSE MONOHYDRATE PRN ML: 25 INJECTION, SOLUTION INTRAVENOUS at 16:21

## 2019-01-28 RX ADMIN — CAPTOPRIL SCH MG: 25 TABLET ORAL at 21:00

## 2019-01-28 RX ADMIN — HYDROCORTISONE SCH GM: 25 CREAM TOPICAL at 21:38

## 2019-01-28 RX ADMIN — PROPRANOLOL HYDROCHLORIDE SCH MG: 20 TABLET ORAL at 21:39

## 2019-01-28 RX ADMIN — INSULIN LISPRO PRN UNITS: 100 INJECTION, SOLUTION INTRAVENOUS; SUBCUTANEOUS at 12:31

## 2019-01-28 RX ADMIN — Medication SCH DEV: at 12:15

## 2019-01-28 RX ADMIN — Medication SCH DEV: at 16:28

## 2019-01-28 RX ADMIN — Medication SCH DEV: at 21:22

## 2019-01-28 NOTE — NUR
NOTIFIED OF PT DROP IN SUGAR AND THEN ELEVATION WHEN REASSESSED. PT BS . DR DESIR 
SAID TO NOT GIVE ANYTHING AND WAIT TILL PT HAS NEXT MEAL.

## 2019-01-28 NOTE — NUR
Patient will be admitted to care of DR. UGALDE. Admited to TELE.  Will go to 
room 108B. Belongings list completed.  Report to RN.

## 2019-01-28 NOTE — NUR
TOOK BS RESULT 172 MG/DL. PT DID NOT TAKE ANY FOOD ONLY WATER ER DAUGHTER. NO HUMALOG 
INSULIN [place] GIVEN FOR TONIGHT ONLY. DR. TILLMAN.

## 2019-01-28 NOTE — NUR
RECEIVED REPORT FROM ED NURSE. PT C/O OF CHEST PAIN IN ED. NO COMPLAINT OF CHEST PAIN ON 
ARRIVAL TO UNIT. PT IS AAOX3, DAUGHTER AT BEDSIDE. SKIN IS INTACT WITH SCABBED OVER 
SCRATCHES NOTED ALL AROUND BODY. PT STATES SHE GETS ITCHY DURING DIALYSIS. PT TOO WEAK TO 
AMBULATE TO BED SO WE TRANSFERED HER OVER. MRSA COLLECTED AND TAKEN TO LAB. VITAL SIGNS 
STABLE. R HAND 20G INTACT AND PATENT. PT VERY SLEEPY FROM MORPHINE GIVEN IN ED. WILL 
CONTINUE TO MONITOR PT. BED IN LOW POSITION, CALL LIGHT WITHIN REACH. FALL RISK SIGN POSTED, 
GOWN ON.

## 2019-01-28 NOTE — NUR
PT IN BED SLEEPING WITH DAUGHTER AT BEDSIDE. NO SIGNS OF PAIN OR DISTRESS NOTED. WILL 
CONTINUE TO MONITOR.

## 2019-01-28 NOTE — NUR
RECEIVED REPORT FROM AM SHIFT NO COMPLAINT OF CHEST PAIN PT IS AAOX3, DAUGHTER AT BEDSIDE. 
SKIN IS INTACT WITH SCABBED OVER SCRATCHES NOTED ALL AROUND BODY. PT STATES SHE GETS ITCHY 
DURING DIALYSIS.  R HAND 20G INTACT AND PATENT. PT VERY SLEEPY . BED IN LOW POSITION, CALL 
LIGHT WITHIN REACH. FALL RISK PRECAUTION.WILL CONTINUE TO MONITOR PT.

## 2019-01-28 NOTE — NUR
PER AMR UPON ARRIVAL AT [facility] PT WAS ON CONTINUED CPR AFTER 
ABOUT  ABOUT 2 HOURS ON DIALYSIS AND CPR CONTINUED BY EMS FOR APPROXIMATELY 2 
MINUTES. NO MEDICATION GIVEN. PT UNABLE TO ANSWER QUESTION. PT SQUIRMING IN BED 
AND MOANING. BLOOD PRESSURE ELEVATED. OTHER VITALS WNL. DIALYSIS SHUNT CLAMPED. 
IV ACCESS ON RIGHT HAND, 20G. BELLY DISTENDED; UNABLE TO VERBALIZE ANY PAIN. 
HOB ELEVATED; BEDRAILS UP X2; BED DOWN. ER MD MADE AWARE OF PT STATUS.







HX; DM, HTN, CHF, CIRRHOSIS, PEPTIC ULCER DZ, DEPRESSION, ANEMIA

RX; CAPTORPIL, PROPRANOLOL, AMLOPDIPINE, OMEPRAZOL

## 2019-01-28 NOTE — NUR
CAPOTEN NOT GIVEN DUE TO BP= 109/69. FOR DIALYSI. FF UP W/ DIALYSIS NURSE AND SHE SAID SHE 
WILL BE HERE AT 10'30PM

## 2019-01-29 VITALS — SYSTOLIC BLOOD PRESSURE: 155 MMHG | DIASTOLIC BLOOD PRESSURE: 56 MMHG

## 2019-01-29 VITALS — DIASTOLIC BLOOD PRESSURE: 54 MMHG | SYSTOLIC BLOOD PRESSURE: 146 MMHG

## 2019-01-29 VITALS — DIASTOLIC BLOOD PRESSURE: 55 MMHG | SYSTOLIC BLOOD PRESSURE: 146 MMHG

## 2019-01-29 VITALS — SYSTOLIC BLOOD PRESSURE: 126 MMHG | DIASTOLIC BLOOD PRESSURE: 63 MMHG

## 2019-01-29 VITALS — DIASTOLIC BLOOD PRESSURE: 53 MMHG | SYSTOLIC BLOOD PRESSURE: 121 MMHG

## 2019-01-29 VITALS — SYSTOLIC BLOOD PRESSURE: 120 MMHG | DIASTOLIC BLOOD PRESSURE: 41 MMHG

## 2019-01-29 LAB
ANION GAP SERPL CALCULATED.3IONS-SCNC: 10.8 MMOL/L (ref 8–16)
BASOPHILS # BLD AUTO: 0.1 K/UL (ref 0–0.22)
BASOPHILS NFR BLD AUTO: 1.7 % (ref 0–2)
BUN SERPL-MCNC: 10 MG/DL (ref 7–18)
CHLORIDE SERPL-SCNC: 106 MMOL/L (ref 98–107)
CO2 SERPL-SCNC: 27.2 MMOL/L (ref 21–32)
CREAT SERPL-MCNC: 3.8 MG/DL (ref 0.6–1.3)
EOSINOPHIL # BLD AUTO: 0.2 K/UL (ref 0–0.4)
EOSINOPHIL NFR BLD AUTO: 6.9 % (ref 0–4)
ERYTHROCYTE [DISTWIDTH] IN BLOOD BY AUTOMATED COUNT: 19.1 % (ref 11.6–13.7)
GFR SERPL CREATININE-BSD FRML MDRD: 15 ML/MIN (ref 90–?)
GLUCOSE SERPL-MCNC: 196 MG/DL (ref 74–106)
HCT VFR BLD AUTO: 30 % (ref 36–48)
HGB BLD-MCNC: 9.6 G/DL (ref 12–16)
LYMPHOCYTES # BLD AUTO: 0.1 K/UL (ref 2.5–16.5)
LYMPHOCYTES NFR BLD AUTO: 3.2 % (ref 20.5–51.1)
MAGNESIUM SERPL-MCNC: 1.9 MG/DL (ref 1.8–2.4)
MCH RBC QN AUTO: 29 PG (ref 27–31)
MCHC RBC AUTO-ENTMCNC: 32 G/DL (ref 33–37)
MCV RBC AUTO: 90.8 FL (ref 80–94)
MONOCYTES # BLD AUTO: 0.2 K/UL (ref 0.8–1)
MONOCYTES NFR BLD AUTO: 6 % (ref 1.7–9.3)
NEUTROPHILS # BLD AUTO: 2.5 K/UL (ref 1.8–7.7)
NEUTROPHILS NFR BLD AUTO: 82.2 % (ref 42.2–75.2)
PHOSPHATE SERPL-MCNC: 3.9 MG/DL (ref 2.5–4.9)
PLATELET # BLD AUTO: 55 K/UL (ref 140–450)
POTASSIUM SERPL-SCNC: 4 MMOL/L (ref 3.5–5.1)
RBC # BLD AUTO: 3.3 MIL/UL (ref 4.2–5.4)
SODIUM SERPL-SCNC: 140 MMOL/L (ref 136–145)
T3RU NFR SERPL: 27 % (ref 24–39)
T4 SERPL-MCNC: 9 UG/DL (ref 4.5–12)
WBC # BLD AUTO: 3.1 K/UL (ref 4.8–10.8)

## 2019-01-29 RX ADMIN — PROPRANOLOL HYDROCHLORIDE SCH MG: 20 TABLET ORAL at 10:31

## 2019-01-29 RX ADMIN — HYDROCORTISONE SCH GM: 25 CREAM TOPICAL at 10:30

## 2019-01-29 RX ADMIN — INSULIN GLARGINE SCH UNITS: 100 INJECTION, SOLUTION SUBCUTANEOUS at 10:36

## 2019-01-29 RX ADMIN — DEXTROSE AND SODIUM CHLORIDE SCH MLS/HR: 5; .9 INJECTION, SOLUTION INTRAVENOUS at 21:25

## 2019-01-29 RX ADMIN — Medication SCH DEV: at 16:50

## 2019-01-29 RX ADMIN — INSULIN LISPRO PRN UNITS: 100 INJECTION, SOLUTION INTRAVENOUS; SUBCUTANEOUS at 06:15

## 2019-01-29 RX ADMIN — Medication SCH DEV: at 12:20

## 2019-01-29 RX ADMIN — INSULIN LISPRO PRN UNITS: 100 INJECTION, SOLUTION INTRAVENOUS; SUBCUTANEOUS at 12:30

## 2019-01-29 RX ADMIN — FERROUS SULFATE TAB 325 MG (65 MG ELEMENTAL FE) SCH MG: 325 (65 FE) TAB at 10:30

## 2019-01-29 RX ADMIN — Medication SCH EACH: at 10:29

## 2019-01-29 RX ADMIN — HYDROCORTISONE SCH GM: 25 CREAM TOPICAL at 20:47

## 2019-01-29 RX ADMIN — CAPTOPRIL SCH MG: 25 TABLET ORAL at 10:30

## 2019-01-29 RX ADMIN — Medication SCH DEV: at 06:15

## 2019-01-29 RX ADMIN — DOCUSATE SODIUM SCH MG: 100 CAPSULE, LIQUID FILLED ORAL at 20:46

## 2019-01-29 RX ADMIN — CAPTOPRIL SCH MG: 25 TABLET ORAL at 20:38

## 2019-01-29 RX ADMIN — LEVOTHYROXINE SODIUM SCH MG: 50 TABLET ORAL at 06:30

## 2019-01-29 RX ADMIN — Medication SCH TAB: at 10:29

## 2019-01-29 RX ADMIN — PROPRANOLOL HYDROCHLORIDE SCH MG: 20 TABLET ORAL at 20:39

## 2019-01-29 RX ADMIN — Medication SCH DEV: at 20:38

## 2019-01-29 RX ADMIN — DEXTROSE MONOHYDRATE PRN ML: 25 INJECTION, SOLUTION INTRAVENOUS at 14:55

## 2019-01-29 RX ADMIN — MENTHOL, METHYL SALICYLATE PRN GM: 10; 15 CREAM TOPICAL at 20:47

## 2019-01-29 NOTE — NUR
RECEIVED REPORT FROM AM SHIFT AT BEDSIDE. PT IS AAOX3, SKIN IS INTACT, PT SCRATCHES NOTED 
ALL AROUND BODY. IV TO R HAND, 20G, INTACT AND PATENT, INFUSING WELL. PT ON TELE. BED IN 
LOWEST POSITION, CALL LIGHT WITHIN REACH. FALL RISK PRECAUTION. WILL CONTINUE TO MONITOR PT.

## 2019-01-29 NOTE — NUR
DR THURSTON SEEN THE PT. PER DR THURSTON, TROP IS DUE TO CHF. WILL ORDER BENGAY FOR CHEST 
MUSCLE TENDERNESS, AND COLACE FOR CONSTIPATION.

## 2019-01-29 NOTE — NUR
RECEIVED REPORT FROM NIGHT SHIFT AT BEDSIDE. PT IS AAOX3, SKIN IS INTACT, PT SCRATCHES NOTED 
ALL AROUND BODY. IV TO R HAND, 20G, INTACT AND PATENT, INFUSING WELL. PT ON TELE. BED IN 
LOWEST POSITION, CALL LIGHT WITHIN REACH. FALL RISK PRECAUTION. WILL CONTINUE TO MONITOR PT.

## 2019-01-30 VITALS — DIASTOLIC BLOOD PRESSURE: 56 MMHG | SYSTOLIC BLOOD PRESSURE: 125 MMHG

## 2019-01-30 VITALS — DIASTOLIC BLOOD PRESSURE: 50 MMHG | SYSTOLIC BLOOD PRESSURE: 148 MMHG

## 2019-01-30 VITALS — SYSTOLIC BLOOD PRESSURE: 118 MMHG | DIASTOLIC BLOOD PRESSURE: 52 MMHG

## 2019-01-30 VITALS — DIASTOLIC BLOOD PRESSURE: 52 MMHG | SYSTOLIC BLOOD PRESSURE: 132 MMHG

## 2019-01-30 VITALS — DIASTOLIC BLOOD PRESSURE: 54 MMHG | SYSTOLIC BLOOD PRESSURE: 154 MMHG

## 2019-01-30 VITALS — SYSTOLIC BLOOD PRESSURE: 118 MMHG | DIASTOLIC BLOOD PRESSURE: 47 MMHG

## 2019-01-30 LAB
ANION GAP SERPL CALCULATED.3IONS-SCNC: 10.1 MMOL/L (ref 8–16)
BASOPHILS # BLD AUTO: 0 K/UL (ref 0–0.22)
BASOPHILS NFR BLD AUTO: 1.3 % (ref 0–2)
BUN SERPL-MCNC: 27 MG/DL (ref 7–18)
CHLORIDE SERPL-SCNC: 104 MMOL/L (ref 98–107)
CO2 SERPL-SCNC: 25.4 MMOL/L (ref 21–32)
CREAT SERPL-MCNC: 6.2 MG/DL (ref 0.6–1.3)
EOSINOPHIL # BLD AUTO: 0.4 K/UL (ref 0–0.4)
EOSINOPHIL NFR BLD AUTO: 11.2 % (ref 0–4)
ERYTHROCYTE [DISTWIDTH] IN BLOOD BY AUTOMATED COUNT: 19.4 % (ref 11.6–13.7)
GFR SERPL CREATININE-BSD FRML MDRD: 9 ML/MIN (ref 90–?)
GLUCOSE SERPL-MCNC: 170 MG/DL (ref 74–106)
HCT VFR BLD AUTO: 29.3 % (ref 36–48)
HGB BLD-MCNC: 9.3 G/DL (ref 12–16)
LYMPHOCYTES # BLD AUTO: 0.3 K/UL (ref 2.5–16.5)
LYMPHOCYTES NFR BLD AUTO: 7.8 % (ref 20.5–51.1)
MAGNESIUM SERPL-MCNC: 2.1 MG/DL (ref 1.8–2.4)
MCH RBC QN AUTO: 29 PG (ref 27–31)
MCHC RBC AUTO-ENTMCNC: 32 G/DL (ref 33–37)
MCV RBC AUTO: 90.8 FL (ref 80–94)
MONOCYTES # BLD AUTO: 0.3 K/UL (ref 0.8–1)
MONOCYTES NFR BLD AUTO: 9 % (ref 1.7–9.3)
NEUTROPHILS # BLD AUTO: 2.3 K/UL (ref 1.8–7.7)
NEUTROPHILS NFR BLD AUTO: 70.7 % (ref 42.2–75.2)
PHOSPHATE SERPL-MCNC: 5.6 MG/DL (ref 2.5–4.9)
PLATELET # BLD AUTO: 55 K/UL (ref 140–450)
POTASSIUM SERPL-SCNC: 4.5 MMOL/L (ref 3.5–5.1)
RBC # BLD AUTO: 3.23 MIL/UL (ref 4.2–5.4)
SODIUM SERPL-SCNC: 135 MMOL/L (ref 136–145)
WBC # BLD AUTO: 3.2 K/UL (ref 4.8–10.8)

## 2019-01-30 RX ADMIN — Medication SCH DEV: at 05:58

## 2019-01-30 RX ADMIN — FERROUS SULFATE TAB 325 MG (65 MG ELEMENTAL FE) SCH MG: 325 (65 FE) TAB at 10:28

## 2019-01-30 RX ADMIN — Medication SCH DEV: at 20:47

## 2019-01-30 RX ADMIN — FAMOTIDINE SCH MG: 20 TABLET ORAL at 10:27

## 2019-01-30 RX ADMIN — INSULIN GLARGINE SCH UNITS: 100 INJECTION, SOLUTION SUBCUTANEOUS at 09:00

## 2019-01-30 RX ADMIN — DEXTROSE AND SODIUM CHLORIDE SCH MLS/HR: 5; .9 INJECTION, SOLUTION INTRAVENOUS at 21:25

## 2019-01-30 RX ADMIN — DOCUSATE SODIUM SCH MG: 100 CAPSULE, LIQUID FILLED ORAL at 10:27

## 2019-01-30 RX ADMIN — MORPHINE SULFATE PRN MG: 2 INJECTION, SOLUTION INTRAMUSCULAR; INTRAVENOUS at 19:38

## 2019-01-30 RX ADMIN — MORPHINE SULFATE PRN MG: 2 INJECTION, SOLUTION INTRAMUSCULAR; INTRAVENOUS at 10:22

## 2019-01-30 RX ADMIN — CAPTOPRIL SCH MG: 25 TABLET ORAL at 10:33

## 2019-01-30 RX ADMIN — HYDROCORTISONE SCH GM: 25 CREAM TOPICAL at 10:31

## 2019-01-30 RX ADMIN — Medication SCH DEV: at 11:30

## 2019-01-30 RX ADMIN — Medication SCH EACH: at 10:29

## 2019-01-30 RX ADMIN — Medication SCH TAB: at 10:27

## 2019-01-30 RX ADMIN — INSULIN LISPRO PRN UNITS: 100 INJECTION, SOLUTION INTRAVENOUS; SUBCUTANEOUS at 13:15

## 2019-01-30 RX ADMIN — INSULIN LISPRO PRN UNITS: 100 INJECTION, SOLUTION INTRAVENOUS; SUBCUTANEOUS at 17:01

## 2019-01-30 RX ADMIN — CAPTOPRIL SCH MG: 25 TABLET ORAL at 10:27

## 2019-01-30 RX ADMIN — DOCUSATE SODIUM SCH MG: 100 CAPSULE, LIQUID FILLED ORAL at 20:38

## 2019-01-30 RX ADMIN — LEVOTHYROXINE SODIUM SCH MG: 50 TABLET ORAL at 07:28

## 2019-01-30 RX ADMIN — CALCIUM CARBONATE-VITAMIN D TAB 500 MG-200 UNIT SCH TAB: 500-200 TAB at 20:39

## 2019-01-30 RX ADMIN — HYDROCORTISONE SCH DOSE: 25 CREAM TOPICAL at 20:42

## 2019-01-30 RX ADMIN — Medication SCH DEV: at 16:51

## 2019-01-30 NOTE — NUR
ENDORSED TO AM SHIFT FOR CONTINUITY OF CARE. PT IN STABLE CONDITION

-------------------------------------------------------------------------------

Addendum: 01/30/19 at 0759 by Brooke Charlton RN

-------------------------------------------------------------------------------

WRONG TIME. PLS DISCARD

## 2019-01-30 NOTE — NUR
RECEIVED ENDORSEMENT FROM AM RICHY VEGA; PATIENT A&Ox3, ABLE TO MAKE NEEDS KNOWN. Somali 
SPEAKING. PATIENT ON O2 RUNNING AT 2LPM VIA NASAL CANNULA, NO SOB OR DISTRESS NOTED. SKIN 
INTACT; HEMORRHOIDS NOTED ON BUTTOCKS. PATIENT UNABLE TO AMBULATE. PATIENT INCONTINENT. IV 
ON RIGHT HAND, 20 GAUGE, INFUSING D5LR AT 20mL/HR, IV CLEAN DRY AND INTACT. PATIENT ON TELE 
MONITOR AND STANDARD PRECAUTIONS. BED IN THE LOWEST POSITION, CALL LIGHT WITHIN REACH. 
INITIAL ASSESSMENT DONE, ALL SAFETY PRECAUTIONS MET. WILL CONTINUE TO MONITOR.

## 2019-01-30 NOTE — NUR
PATIENT HAVING DIALYSIS AT THIS TIME. ADMINISTERED SCHEDULED MEDS. HELD BP MEDS D/T 
DIALYSIS. PATIENT TOLERATED WELL. WILL CONTINUE TO MONITOR.

## 2019-01-30 NOTE — NUR
PT AWAKE,TALKING TO SON AT THE BEDSIDE.  NO SOB NOTED. NO COMPLAINTS MADE. WILL ENDORSE TO 
NEXT SHIFT NURSE FOR CONTINUITY OF CARE.

## 2019-01-30 NOTE — NUR
ROUNDS MADE. PATIENT ASLEEP, EYES CLOSED, VISIBLE CHEST RISE AND FALL NOTED. WILL CONTINUE 
TO MONITOR.

## 2019-01-30 NOTE — NUR
ASSUMED CARE FROM ROSI. PT AWAKE, IN STABLE CONDITION. NO SOB NOTED. NO COMPLAINTS 
MADE. 

-------------------------------------------------------------------------------

Addendum: 01/30/19 at 1905 by Kisha Page RN

-------------------------------------------------------------------------------

DISREGARD ABOVE NOTES, WRONG TIME.

## 2019-01-30 NOTE — NUR
RECEIVED BEDSIDE REPORT FROM NIGHT SHIFT NURSE. PATIENT AAOX3. PATIENT ON 2 L NC, NO 
DISTRESS NOTED. SKIN INTACT, EXCEPT HEMORRHOIDS ON BUTTOCKS. PATIENT UNABLE TO AMBULATE. 
PATIENT INCONTINENT. IV ON R HAND 20 G INFUSING D5 LR AT 20, IV CLEAN DRY AND INTACT. 
PATIENT ON TELE MONITOR AND STANDARD PRECAUTIONS. BED IN LOW POSITION, CALL LIGHT WITHIN 
REACH. WILL CONTINUE TO MONITOR.

## 2019-01-31 VITALS — DIASTOLIC BLOOD PRESSURE: 56 MMHG | SYSTOLIC BLOOD PRESSURE: 134 MMHG

## 2019-01-31 VITALS — DIASTOLIC BLOOD PRESSURE: 52 MMHG | SYSTOLIC BLOOD PRESSURE: 148 MMHG

## 2019-01-31 VITALS — SYSTOLIC BLOOD PRESSURE: 133 MMHG | DIASTOLIC BLOOD PRESSURE: 54 MMHG

## 2019-01-31 VITALS — DIASTOLIC BLOOD PRESSURE: 53 MMHG | SYSTOLIC BLOOD PRESSURE: 136 MMHG

## 2019-01-31 VITALS — DIASTOLIC BLOOD PRESSURE: 55 MMHG | SYSTOLIC BLOOD PRESSURE: 137 MMHG

## 2019-01-31 VITALS — DIASTOLIC BLOOD PRESSURE: 60 MMHG | SYSTOLIC BLOOD PRESSURE: 158 MMHG

## 2019-01-31 VITALS — SYSTOLIC BLOOD PRESSURE: 145 MMHG | DIASTOLIC BLOOD PRESSURE: 54 MMHG

## 2019-01-31 LAB
ANION GAP SERPL CALCULATED.3IONS-SCNC: 10.9 MMOL/L (ref 8–16)
BASOPHILS # BLD AUTO: 0 K/UL (ref 0–0.22)
BASOPHILS NFR BLD AUTO: 1.1 % (ref 0–2)
BUN SERPL-MCNC: 24 MG/DL (ref 7–18)
CHLORIDE SERPL-SCNC: 100 MMOL/L (ref 98–107)
CO2 SERPL-SCNC: 29 MMOL/L (ref 21–32)
CREAT SERPL-MCNC: 5.3 MG/DL (ref 0.6–1.3)
EOSINOPHIL # BLD AUTO: 0.4 K/UL (ref 0–0.4)
EOSINOPHIL NFR BLD AUTO: 11 % (ref 0–4)
ERYTHROCYTE [DISTWIDTH] IN BLOOD BY AUTOMATED COUNT: 18.5 % (ref 11.6–13.7)
GFR SERPL CREATININE-BSD FRML MDRD: 10 ML/MIN (ref 90–?)
GLUCOSE SERPL-MCNC: 147 MG/DL (ref 74–106)
HCT VFR BLD AUTO: 29.3 % (ref 36–48)
HGB BLD-MCNC: 9.3 G/DL (ref 12–16)
LYMPHOCYTES # BLD AUTO: 0.3 K/UL (ref 2.5–16.5)
LYMPHOCYTES NFR BLD AUTO: 9 % (ref 20.5–51.1)
MCH RBC QN AUTO: 29 PG (ref 27–31)
MCHC RBC AUTO-ENTMCNC: 32 G/DL (ref 33–37)
MCV RBC AUTO: 89.4 FL (ref 80–94)
MONOCYTES # BLD AUTO: 0.4 K/UL (ref 0.8–1)
MONOCYTES NFR BLD AUTO: 11.9 % (ref 1.7–9.3)
NEUTROPHILS # BLD AUTO: 2.1 K/UL (ref 1.8–7.7)
NEUTROPHILS NFR BLD AUTO: 67 % (ref 42.2–75.2)
PLATELET # BLD AUTO: 57 K/UL (ref 140–450)
POTASSIUM SERPL-SCNC: 3.9 MMOL/L (ref 3.5–5.1)
RBC # BLD AUTO: 3.27 MIL/UL (ref 4.2–5.4)
SODIUM SERPL-SCNC: 136 MMOL/L (ref 136–145)
WBC # BLD AUTO: 3.2 K/UL (ref 4.8–10.8)

## 2019-01-31 RX ADMIN — Medication SCH DEV: at 20:26

## 2019-01-31 RX ADMIN — PROPRANOLOL HYDROCHLORIDE SCH MG: 20 TABLET ORAL at 20:22

## 2019-01-31 RX ADMIN — LEVOTHYROXINE SODIUM SCH MG: 50 TABLET ORAL at 06:32

## 2019-01-31 RX ADMIN — HYOSCYAMINE SULFATE SCH MG: 0.12 TABLET ORAL at 20:20

## 2019-01-31 RX ADMIN — CALCIUM CARBONATE-VITAMIN D TAB 500 MG-200 UNIT SCH TAB: 500-200 TAB at 08:58

## 2019-01-31 RX ADMIN — Medication SCH DEV: at 16:26

## 2019-01-31 RX ADMIN — HYOSCYAMINE SULFATE SCH MG: 0.12 TABLET ORAL at 17:08

## 2019-01-31 RX ADMIN — MORPHINE SULFATE PRN MG: 2 INJECTION, SOLUTION INTRAMUSCULAR; INTRAVENOUS at 20:22

## 2019-01-31 RX ADMIN — MORPHINE SULFATE PRN MG: 2 INJECTION, SOLUTION INTRAMUSCULAR; INTRAVENOUS at 00:52

## 2019-01-31 RX ADMIN — Medication SCH TAB: at 08:58

## 2019-01-31 RX ADMIN — FERROUS SULFATE TAB 325 MG (65 MG ELEMENTAL FE) SCH MG: 325 (65 FE) TAB at 08:57

## 2019-01-31 RX ADMIN — CAPTOPRIL SCH MG: 25 TABLET ORAL at 08:56

## 2019-01-31 RX ADMIN — HYOSCYAMINE SULFATE SCH MG: 0.12 TABLET ORAL at 11:40

## 2019-01-31 RX ADMIN — HYDROCORTISONE SCH DOSE: 25 CREAM TOPICAL at 09:39

## 2019-01-31 RX ADMIN — Medication SCH DEV: at 06:17

## 2019-01-31 RX ADMIN — CAPTOPRIL SCH MG: 25 TABLET ORAL at 20:20

## 2019-01-31 RX ADMIN — INSULIN GLARGINE SCH UNITS: 100 INJECTION, SOLUTION SUBCUTANEOUS at 09:04

## 2019-01-31 RX ADMIN — Medication SCH DEV: at 11:39

## 2019-01-31 RX ADMIN — CALCIUM CARBONATE-VITAMIN D TAB 500 MG-200 UNIT SCH TAB: 500-200 TAB at 20:19

## 2019-01-31 RX ADMIN — PROPRANOLOL HYDROCHLORIDE SCH MG: 20 TABLET ORAL at 08:57

## 2019-01-31 RX ADMIN — FAMOTIDINE SCH MG: 20 TABLET ORAL at 08:57

## 2019-01-31 RX ADMIN — HYDROCORTISONE SCH DOSE: 25 CREAM TOPICAL at 20:21

## 2019-01-31 RX ADMIN — Medication SCH EACH: at 08:57

## 2019-01-31 RX ADMIN — INSULIN LISPRO PRN UNITS: 100 INJECTION, SOLUTION INTRAVENOUS; SUBCUTANEOUS at 12:32

## 2019-01-31 RX ADMIN — DOCUSATE SODIUM SCH MG: 100 CAPSULE, LIQUID FILLED ORAL at 20:20

## 2019-01-31 RX ADMIN — DOCUSATE SODIUM SCH MG: 100 CAPSULE, LIQUID FILLED ORAL at 08:56

## 2019-01-31 NOTE — NUR
PATIENT IS AGITATED, CRYING, RESTLESS, PRN ATIVAN PER ORDERED ADMINISTERED. PATIENT RESTING 
ON CHAIR. NO DISTRESS NOTED. CALL LIGHT WITHIN REACH.

## 2019-01-31 NOTE — NUR
Yohana Queen (dialysis nurse) in unit, notified of Dr Daniels order for hemodialysis tomorrow. 
Order read back & verified.

## 2019-01-31 NOTE — NUR
Pt verbalized feeling better. No signs of distress, respirations even & nonlabored, FLACC 0. 
Call light within reach. Right hand IV intact & asymptomatic.

## 2019-01-31 NOTE — NUR
1/31/19 RD INITIAL ASSESSMENT COMPLETED



PLEASE REFER TO NUTRITION ASSESSMENT UNDER CARE ACTIVITY FOR ESTIMATED NUTRITIONAL NEEDS. 



1. [facility] 

2. RD REVIEWED RENAL DIET NUTRITION THERAPY

3. RD TO FOLLOW UP ON ADEQUATE PO INTAKE 

4. RD TO FOLLOW-UP 3-5 DAYS, MODERATE RISK 



BRYAN LYNN, RD

## 2019-01-31 NOTE — NUR
PATIENT IS AWAKE, ALERT, RESPIRATION EVEN AND UNLABORED ON 2L OF O2 VIA NC. COMPLAIN OF PAIN 
10/10 GENERALIZED BODY ACHE. VITAL SIGNS WITHIN THE NORMAL RANGE. MEDS WERE GIVEN PER 
ORDERED. FAMILY AT BEDSIDE. CALL LIGHT WITHIN REACH.

## 2019-01-31 NOTE — NUR
PATIENT IS AWAKE, ALERT, RESPIRATION EVEN AND UNLABOR ON O2 2L VIA NC. DENIES PAIN AND 
DISCOMFORT.  IV IS INTACT AND PATENT. SKIN IS DRY AND WARM. PLAN OF CARE WAS DISCUSS. BED IS 
IN LOW POSITION. CALL LIGHT WITHIN REACH.

## 2019-01-31 NOTE — NUR
Pt c/o feeling SOB with tightness to chest. Pt sitting up at edge of bed, assisted to high 
fowlers position. Vital signs obtained. Pt observed to be generally anxious. Instructed on 
deep breathing exercise; able to return demonstrate. Reassurance provided. Lorazepam 
administered. RN remains at bedside for monitoring.

-------------------------------------------------------------------------------

Addendum: 01/31/19 at 1508 by Cyndi Santana RN

-------------------------------------------------------------------------------

Amended: Links added.

-------------------------------------------------------------------------------

Addendum: 01/31/19 at 1517 by Cyndi Santana RN

-------------------------------------------------------------------------------

Addendum: Pt requests to resume O2. Nasal cannula applies with O2 @ 2Lpm for comfort. Will 
continue to monitor.

## 2019-01-31 NOTE — NUR
Received report from pm nurse. Pt sitting up in chair. No signs of distress, respirations 
even & nonlabored. Call light within reach.

## 2019-01-31 NOTE — NUR
Note:



I faxed inquiry to [facility]. I called and spoke with Ferny at [facility] [phone]. Per Ferny, they can accept patient tomorrow 2/1/19, room 101A, 
accepting physician is .

## 2019-01-31 NOTE — NUR
Pt sitting up in bed, watching TV. Daughter Regina at bedside visiting. Pt interacting 
appropriately, no signs of distress, no c/o discomfort. Right hand iv intact & asymptomatic. 
Call light within reach.

## 2019-02-01 VITALS — DIASTOLIC BLOOD PRESSURE: 61 MMHG | SYSTOLIC BLOOD PRESSURE: 150 MMHG

## 2019-02-01 VITALS — SYSTOLIC BLOOD PRESSURE: 163 MMHG | DIASTOLIC BLOOD PRESSURE: 56 MMHG

## 2019-02-01 VITALS — DIASTOLIC BLOOD PRESSURE: 52 MMHG | SYSTOLIC BLOOD PRESSURE: 139 MMHG

## 2019-02-01 VITALS — SYSTOLIC BLOOD PRESSURE: 138 MMHG | DIASTOLIC BLOOD PRESSURE: 64 MMHG

## 2019-02-01 LAB
ANION GAP SERPL CALCULATED.3IONS-SCNC: 12.6 MMOL/L (ref 8–16)
BASOPHILS # BLD AUTO: 0 K/UL (ref 0–0.22)
BASOPHILS NFR BLD AUTO: 1.2 % (ref 0–2)
BUN SERPL-MCNC: 38 MG/DL (ref 7–18)
CHLORIDE SERPL-SCNC: 98 MMOL/L (ref 98–107)
CO2 SERPL-SCNC: 27.7 MMOL/L (ref 21–32)
CREAT SERPL-MCNC: 7.3 MG/DL (ref 0.6–1.3)
EOSINOPHIL # BLD AUTO: 0.4 K/UL (ref 0–0.4)
EOSINOPHIL NFR BLD AUTO: 10.7 % (ref 0–4)
ERYTHROCYTE [DISTWIDTH] IN BLOOD BY AUTOMATED COUNT: 18.8 % (ref 11.6–13.7)
GFR SERPL CREATININE-BSD FRML MDRD: 7 ML/MIN (ref 90–?)
GLUCOSE SERPL-MCNC: 131 MG/DL (ref 74–106)
HCT VFR BLD AUTO: 28.1 % (ref 36–48)
HGB BLD-MCNC: 9 G/DL (ref 12–16)
LYMPHOCYTES # BLD AUTO: 0.3 K/UL (ref 2.5–16.5)
LYMPHOCYTES NFR BLD AUTO: 9.1 % (ref 20.5–51.1)
MCH RBC QN AUTO: 29 PG (ref 27–31)
MCHC RBC AUTO-ENTMCNC: 32 G/DL (ref 33–37)
MCV RBC AUTO: 89 FL (ref 80–94)
MONOCYTES # BLD AUTO: 0.4 K/UL (ref 0.8–1)
MONOCYTES NFR BLD AUTO: 10.1 % (ref 1.7–9.3)
NEUTROPHILS # BLD AUTO: 2.5 K/UL (ref 1.8–7.7)
NEUTROPHILS NFR BLD AUTO: 68.9 % (ref 42.2–75.2)
PLATELET # BLD AUTO: 62 K/UL (ref 140–450)
POTASSIUM SERPL-SCNC: 4.3 MMOL/L (ref 3.5–5.1)
RBC # BLD AUTO: 3.16 MIL/UL (ref 4.2–5.4)
SODIUM SERPL-SCNC: 134 MMOL/L (ref 136–145)
WBC # BLD AUTO: 3.6 K/UL (ref 4.8–10.8)

## 2019-02-01 RX ADMIN — INSULIN GLARGINE SCH UNITS: 100 INJECTION, SOLUTION SUBCUTANEOUS at 09:03

## 2019-02-01 RX ADMIN — HYOSCYAMINE SULFATE SCH MG: 0.12 TABLET ORAL at 00:09

## 2019-02-01 RX ADMIN — INSULIN LISPRO PRN UNITS: 100 INJECTION, SOLUTION INTRAVENOUS; SUBCUTANEOUS at 13:37

## 2019-02-01 RX ADMIN — LEVOTHYROXINE SODIUM SCH MG: 50 TABLET ORAL at 06:04

## 2019-02-01 RX ADMIN — MORPHINE SULFATE PRN MG: 2 INJECTION, SOLUTION INTRAMUSCULAR; INTRAVENOUS at 04:35

## 2019-02-01 RX ADMIN — CAPTOPRIL SCH MG: 25 TABLET ORAL at 09:00

## 2019-02-01 RX ADMIN — Medication SCH EACH: at 09:00

## 2019-02-01 RX ADMIN — MORPHINE SULFATE PRN MG: 2 INJECTION, SOLUTION INTRAMUSCULAR; INTRAVENOUS at 00:26

## 2019-02-01 RX ADMIN — FAMOTIDINE SCH MG: 20 TABLET ORAL at 09:00

## 2019-02-01 RX ADMIN — Medication SCH DEV: at 06:17

## 2019-02-01 RX ADMIN — HYOSCYAMINE SULFATE SCH MG: 0.12 TABLET ORAL at 12:11

## 2019-02-01 RX ADMIN — DEXTROSE AND SODIUM CHLORIDE SCH MLS/HR: 5; .9 INJECTION, SOLUTION INTRAVENOUS at 00:46

## 2019-02-01 RX ADMIN — MENTHOL, METHYL SALICYLATE PRN GM: 10; 15 CREAM TOPICAL at 00:01

## 2019-02-01 RX ADMIN — HYDROCORTISONE SCH DOSE: 25 CREAM TOPICAL at 09:08

## 2019-02-01 RX ADMIN — FERROUS SULFATE TAB 325 MG (65 MG ELEMENTAL FE) SCH MG: 325 (65 FE) TAB at 09:00

## 2019-02-01 RX ADMIN — HYOSCYAMINE SULFATE SCH MG: 0.12 TABLET ORAL at 09:00

## 2019-02-01 RX ADMIN — HYOSCYAMINE SULFATE SCH MG: 0.12 TABLET ORAL at 04:34

## 2019-02-01 RX ADMIN — CALCIUM CARBONATE-VITAMIN D TAB 500 MG-200 UNIT SCH TAB: 500-200 TAB at 09:01

## 2019-02-01 RX ADMIN — Medication SCH DEV: at 11:30

## 2019-02-01 RX ADMIN — MORPHINE SULFATE PRN MG: 2 INJECTION, SOLUTION INTRAMUSCULAR; INTRAVENOUS at 12:11

## 2019-02-01 RX ADMIN — DOCUSATE SODIUM SCH MG: 100 CAPSULE, LIQUID FILLED ORAL at 09:01

## 2019-02-01 RX ADMIN — PROPRANOLOL HYDROCHLORIDE SCH MG: 20 TABLET ORAL at 09:00

## 2019-02-01 RX ADMIN — Medication SCH TAB: at 09:00

## 2019-02-01 NOTE — NUR
PATIENT IS COMPLAINING OF PAIN 10/10. PRN PAIN MED AND ORDER MEDS WERE GIVEN. VITALS WITHIN 
THE NORMAL RANGE. CALL LIGHT WITHIN REACH.

## 2019-02-01 NOTE — NUR
**** P.T. NOTES ****



UNABLE TO SEE FOR P.T. SERVICES DUE TO PATIENT IS UNDERGOING HEMO DIALYSIS AT THIS TIME. 
PLAN: WE'LL FOLLOW UP ON NEXT SCHEDULED VISIT AND TO CONTINUE AS PER PLAN OF CARE IF SHE 
REMAINS IN THIS HOSPITAL.

## 2019-02-01 NOTE — NUR
GRAND DAUGHTER JAN ARRIVED TO TAKE PT TO [facility] AT TIME OF DISCHARGE. JAN INFORMED HER 
AUNT SHAD AYON OF DISCHARGE TIME AROUND 2PM AT THIS TIME.

## 2019-02-01 NOTE — NUR
PATIENT IS MOANING OF PAIN. IV SITE INFILTRATED. NEW IV SITE INSERTED IN THE RIGHT HAND 24 
G. PRN PAIN MEDS GIVEN. CALL LIGHT WITHIN REACH.

## 2019-02-01 NOTE — NUR
PATIENT IS SLEEPING RESPIRATION EVEN AND UNLABORED ON O2 2L VIA NC. NO DISTRESS NOTED AT 
THIS TIME. CALL LIGHT WITHIN REACH.

## 2019-02-01 NOTE — NUR
CALLED KERLINE NANY (DAUGHTER [place]) AT [phone] TO INFORM HER OF TRANSPORT TO 
[facility]. PT WILL BE TRANSPORTED BY FAMILY TO [facility]. ALL QUESTIONS ANSWERED AT THIS 
TIME.

## 2019-02-01 NOTE — NUR
RECEIVED REPORT FROM NIGHT SHIFT NURSE. PT IN STABLE CONDITION. RESPIRATIONS EVEN AND 
UNLABORED. IV INTACT. SAFETY MEASURES IN PLACE. BED IN LOW POSITION. BED ALARM ON. CALL 
LIGHT AT BEDSIDE. WILL CONTINUE TO MONITOR.

## 2019-02-01 NOTE — NUR
SIMETHACONE NOT GIVEN DUE TO GI COCKTAIL ORDERED AT THIS TIME. PT TOLERATED WELL. WILL 
CONTINUE TO MONITOR.

## 2019-02-01 NOTE — NUR
GAVE DISCHARGE INSTRUCTIONS AND ANSWERED ALL QUESTIONS AT THIS TIME. PT VERBALIZED 
UNDERSTANDING OF INSTRUCTIONS. IV REMOVED, LUMEN INTACT. ID BAND REMOVED. PT WHEELED TO 
LOBBY IN WHEELCHAIR WHERE FAMILY MEMBER JAN WAS WAITING WITH VEHICLE. PT IN STABLE 
CONDITION.

## 2019-12-16 ENCOUNTER — HOSPITAL ENCOUNTER (INPATIENT)
Dept: HOSPITAL 26 - MED | Age: 69
LOS: 4 days | Discharge: HOME | DRG: 871 | End: 2019-12-20
Attending: GENERAL PRACTICE | Admitting: GENERAL PRACTICE
Payer: COMMERCIAL

## 2019-12-16 VITALS — SYSTOLIC BLOOD PRESSURE: 101 MMHG | DIASTOLIC BLOOD PRESSURE: 52 MMHG

## 2019-12-16 VITALS — WEIGHT: 147.01 LBS | BODY MASS INDEX: 27.05 KG/M2 | HEIGHT: 62 IN

## 2019-12-16 VITALS — SYSTOLIC BLOOD PRESSURE: 151 MMHG | DIASTOLIC BLOOD PRESSURE: 66 MMHG

## 2019-12-16 DIAGNOSIS — E83.51: ICD-10-CM

## 2019-12-16 DIAGNOSIS — K59.00: ICD-10-CM

## 2019-12-16 DIAGNOSIS — E83.39: ICD-10-CM

## 2019-12-16 DIAGNOSIS — I13.0: ICD-10-CM

## 2019-12-16 DIAGNOSIS — A41.9: Primary | ICD-10-CM

## 2019-12-16 DIAGNOSIS — D61.818: ICD-10-CM

## 2019-12-16 DIAGNOSIS — E11.22: ICD-10-CM

## 2019-12-16 DIAGNOSIS — D63.8: ICD-10-CM

## 2019-12-16 DIAGNOSIS — F17.200: ICD-10-CM

## 2019-12-16 DIAGNOSIS — I50.43: ICD-10-CM

## 2019-12-16 DIAGNOSIS — I50.9: ICD-10-CM

## 2019-12-16 DIAGNOSIS — J69.0: ICD-10-CM

## 2019-12-16 DIAGNOSIS — I27.20: ICD-10-CM

## 2019-12-16 DIAGNOSIS — N17.0: ICD-10-CM

## 2019-12-16 DIAGNOSIS — E44.0: ICD-10-CM

## 2019-12-16 DIAGNOSIS — N18.6: ICD-10-CM

## 2019-12-16 DIAGNOSIS — I95.9: ICD-10-CM

## 2019-12-16 DIAGNOSIS — E83.41: ICD-10-CM

## 2019-12-16 DIAGNOSIS — Z99.2: ICD-10-CM

## 2019-12-16 DIAGNOSIS — N39.0: ICD-10-CM

## 2019-12-16 DIAGNOSIS — Z90.49: ICD-10-CM

## 2019-12-16 DIAGNOSIS — E03.9: ICD-10-CM

## 2019-12-16 LAB
ALBUMIN FLD-MCNC: 3.1 G/DL (ref 3.4–5)
AMYLASE SERPL-CCNC: 77 U/L (ref 25–115)
ANION GAP SERPL CALCULATED.3IONS-SCNC: 20.4 MMOL/L (ref 8–16)
AST SERPL-CCNC: 34 U/L (ref 15–37)
BASOPHILS # BLD AUTO: 0 K/UL (ref 0–0.22)
BASOPHILS NFR BLD AUTO: 1.3 % (ref 0–2)
BILIRUB SERPL-MCNC: 0.7 MG/DL (ref 0–1)
BUN SERPL-MCNC: 77 MG/DL (ref 7–18)
CHLORIDE SERPL-SCNC: 97 MMOL/L (ref 98–107)
CO2 SERPL-SCNC: 24.3 MMOL/L (ref 21–32)
CREAT SERPL-MCNC: 7.4 MG/DL (ref 0.6–1.3)
EOSINOPHIL # BLD AUTO: 0.2 K/UL (ref 0–0.4)
EOSINOPHIL NFR BLD AUTO: 5.7 % (ref 0–4)
ERYTHROCYTE [DISTWIDTH] IN BLOOD BY AUTOMATED COUNT: 20.7 % (ref 11.6–13.7)
GFR SERPL CREATININE-BSD FRML MDRD: 7 ML/MIN (ref 90–?)
GLUCOSE SERPL-MCNC: 253 MG/DL (ref 74–106)
HCT VFR BLD AUTO: 29.9 % (ref 36–48)
HGB BLD-MCNC: 9.2 G/DL (ref 12–16)
LIPASE SERPL-CCNC: 324 U/L (ref 73–393)
LYMPHOCYTES # BLD AUTO: 0.5 K/UL (ref 2.5–16.5)
LYMPHOCYTES NFR BLD AUTO: 11.5 % (ref 20.5–51.1)
MAGNESIUM SERPL-MCNC: 3.3 MG/DL (ref 1.8–2.4)
MCH RBC QN AUTO: 28 PG (ref 27–31)
MCHC RBC AUTO-ENTMCNC: 31 G/DL (ref 33–37)
MCV RBC AUTO: 91.4 FL (ref 80–94)
MONOCYTES # BLD AUTO: 0.2 K/UL (ref 0.8–1)
MONOCYTES NFR BLD AUTO: 5 % (ref 1.7–9.3)
NEUTROPHILS # BLD AUTO: 3 K/UL (ref 1.8–7.7)
NEUTROPHILS NFR BLD AUTO: 76.5 % (ref 42.2–75.2)
PHOSPHATE SERPL-MCNC: 5.6 MG/DL (ref 2.5–4.9)
PLATELET # BLD AUTO: 92 K/UL (ref 140–450)
POTASSIUM SERPL-SCNC: 4.7 MMOL/L (ref 3.5–5.1)
PROTHROMBIN TIME: 12.1 SECS (ref 10.8–13.4)
RBC # BLD AUTO: 3.27 MIL/UL (ref 4.2–5.4)
SODIUM SERPL-SCNC: 137 MMOL/L (ref 136–145)
TSH SERPL DL<=0.05 MIU/L-ACNC: 13.93 UIU/ML (ref 0.34–3.74)
WBC # BLD AUTO: 3.9 K/UL (ref 4.8–10.8)

## 2019-12-16 PROCEDURE — C1758 CATHETER, URETERAL: HCPCS

## 2019-12-16 PROCEDURE — 5A1D70Z PERFORMANCE OF URINARY FILTRATION, INTERMITTENT, LESS THAN 6 HOURS PER DAY: ICD-10-PCS | Performed by: GENERAL PRACTICE

## 2019-12-16 PROCEDURE — C1751 CATH, INF, PER/CENT/MIDLINE: HCPCS

## 2019-12-16 RX ADMIN — SEVELAMER CARBONATE SCH MG: 800 TABLET, FILM COATED ORAL at 21:00

## 2019-12-16 RX ADMIN — CAPTOPRIL SCH MG: 25 TABLET ORAL at 21:00

## 2019-12-16 RX ADMIN — PROPRANOLOL HYDROCHLORIDE SCH MG: 20 TABLET ORAL at 21:00

## 2019-12-16 RX ADMIN — SODIUM CHLORIDE SCH MLS/HR: 9 INJECTION, SOLUTION INTRAVENOUS at 17:18

## 2019-12-16 RX ADMIN — CALCIUM CARBONATE-VITAMIN D TAB 500 MG-200 UNIT SCH TAB: 500-200 TAB at 21:00

## 2019-12-16 RX ADMIN — FERROUS SULFATE TAB 325 MG (65 MG ELEMENTAL FE) SCH MG: 325 (65 FE) TAB at 21:00

## 2019-12-16 NOTE — NUR
# 14 FR Urinary catheter inserted utilizing sterile technique BY STUDENTS AND 
INSTRUCTOR. Urine sample collected and sent to lab. Pt tolerated procedure 
WELL.

## 2019-12-16 NOTE — NUR
Patient will be admitted to care of DR UGALDE. Admited to TELE.  Will go to room 
128B. Belongings list completed AND REVIEWED WITH RICHY KATHLEEN.  Report to HEVER LOCKHART.

## 2019-12-16 NOTE — NUR
BIBA FROM HOME, FAMILY CALLED [phone]. PER EMS, PT WAS HYPOTENSIVE WITH HR OF 42. 
VITALS NOW STABLE. PT ONLY C/O HEADACHE 10/10. PT ALERT AND AWAKE. FEET COLD TO 
TOUCH BILATERALLY. +CMS. UMBILICAL HERNIA. BOWEL SOUNDS ACTIVE IN ALL 4 
QUADRANTS. ABDOMEN SOFT AND ROUND.

PT HAS DIFFICULTY MAKING URINE, UNKNOWN LAST DIALYSIS. PT POOR HISTORIAN AND NO 
FAMILY PRESENT AT THIS TIME.

EMS STARTED AN EJ TO PTS R SIDE OF NECK 18G AND ADMINISTERED APPROX 250 ML OF 
NACL

PMH- HTN AND DM, DIALYSIS, L ARM SHUNT

## 2019-12-16 NOTE — NUR
Admitted from ER TO TELEMETRY UNIT, with chief complaint of LOW BP, HEADACHE, ABD PAIN  , 68 y/o ,Female, Cooperative, AWAKE, A/OX4. RESPIRATION EVEN AND UNLABORED. IV SALINE LOCK AT 
RIGHT IJ G18, PATENT AND INTACT. WITH LEFT AV SHUNT WITH BRUIT AND THRILL. ABLE TO AMBULATE 
AT HOME WITH A WALKER. NOTED DISCOLORATION ON BILATERAL FEET. HEAD TO TOES ASSESSMENT DONE 
WITH CHARGE NURSE JANE, SKIN INTACT. STATED SHE DOES NOT VOID ANYMORE. ON DIALYSIS MWF. 
DENIES PAIN 0/10. ADMISSION DATA AND PLAN OF CARE DISCUSSED WITH PATIENT WITH HELP OF 
 KIARA, #[id number].oriented to call light, bed, phone,television, 
bathroom, smoking policy,

visiting hours, procedures, ID bracelet on. Belongings list checked.

## 2019-12-17 VITALS — SYSTOLIC BLOOD PRESSURE: 133 MMHG | DIASTOLIC BLOOD PRESSURE: 52 MMHG

## 2019-12-17 VITALS — SYSTOLIC BLOOD PRESSURE: 127 MMHG | DIASTOLIC BLOOD PRESSURE: 72 MMHG

## 2019-12-17 VITALS — DIASTOLIC BLOOD PRESSURE: 55 MMHG | SYSTOLIC BLOOD PRESSURE: 124 MMHG

## 2019-12-17 VITALS — SYSTOLIC BLOOD PRESSURE: 129 MMHG | DIASTOLIC BLOOD PRESSURE: 36 MMHG

## 2019-12-17 VITALS — SYSTOLIC BLOOD PRESSURE: 113 MMHG | DIASTOLIC BLOOD PRESSURE: 77 MMHG

## 2019-12-17 VITALS — DIASTOLIC BLOOD PRESSURE: 72 MMHG | SYSTOLIC BLOOD PRESSURE: 128 MMHG

## 2019-12-17 LAB
ANION GAP SERPL CALCULATED.3IONS-SCNC: 12.8 MMOL/L (ref 8–16)
BASOPHILS # BLD AUTO: 0 K/UL (ref 0–0.22)
BASOPHILS NFR BLD AUTO: 0.8 % (ref 0–2)
BUN SERPL-MCNC: 40 MG/DL (ref 7–18)
CHLORIDE SERPL-SCNC: 98 MMOL/L (ref 98–107)
CHOLEST/HDLC SERPL: 2.9 {RATIO} (ref 1–4.5)
CO2 SERPL-SCNC: 30.6 MMOL/L (ref 21–32)
CREAT SERPL-MCNC: 4.3 MG/DL (ref 0.6–1.3)
EOSINOPHIL # BLD AUTO: 0.1 K/UL (ref 0–0.4)
EOSINOPHIL NFR BLD AUTO: 5 % (ref 0–4)
ERYTHROCYTE [DISTWIDTH] IN BLOOD BY AUTOMATED COUNT: 19.6 % (ref 11.6–13.7)
GFR SERPL CREATININE-BSD FRML MDRD: 13 ML/MIN (ref 90–?)
GLUCOSE SERPL-MCNC: 243 MG/DL (ref 74–106)
HCT VFR BLD AUTO: 24 % (ref 36–48)
HDLC SERPL-MCNC: 51 MG/DL (ref 40–60)
HGB BLD-MCNC: 7.7 G/DL (ref 12–16)
LDLC SERPL CALC-MCNC: 78 MG/DL (ref 60–100)
LYMPHOCYTES # BLD AUTO: 0.2 K/UL (ref 2.5–16.5)
LYMPHOCYTES NFR BLD AUTO: 7.4 % (ref 20.5–51.1)
MAGNESIUM SERPL-MCNC: 2.3 MG/DL (ref 1.8–2.4)
MCH RBC QN AUTO: 29 PG (ref 27–31)
MCHC RBC AUTO-ENTMCNC: 32 G/DL (ref 33–37)
MCV RBC AUTO: 88.7 FL (ref 80–94)
MONOCYTES # BLD AUTO: 0.2 K/UL (ref 0.8–1)
MONOCYTES NFR BLD AUTO: 6.9 % (ref 1.7–9.3)
NEUTROPHILS # BLD AUTO: 2 K/UL (ref 1.8–7.7)
NEUTROPHILS NFR BLD AUTO: 79.9 % (ref 42.2–75.2)
PHOSPHATE SERPL-MCNC: 3.6 MG/DL (ref 2.5–4.9)
PLATELET # BLD AUTO: 61 K/UL (ref 140–450)
POTASSIUM SERPL-SCNC: 3.4 MMOL/L (ref 3.5–5.1)
RBC # BLD AUTO: 2.7 MIL/UL (ref 4.2–5.4)
SODIUM SERPL-SCNC: 138 MMOL/L (ref 136–145)
T4 SERPL-MCNC: 8.2 UG/DL (ref 4.5–12)
TRIGL SERPL-MCNC: 91 MG/DL (ref 30–150)
WBC # BLD AUTO: 2.6 K/UL (ref 4.8–10.8)

## 2019-12-17 RX ADMIN — SEVELAMER CARBONATE SCH MG: 800 TABLET, FILM COATED ORAL at 21:11

## 2019-12-17 RX ADMIN — CAPTOPRIL SCH MG: 25 TABLET ORAL at 10:42

## 2019-12-17 RX ADMIN — PROPRANOLOL HYDROCHLORIDE SCH MG: 20 TABLET ORAL at 10:37

## 2019-12-17 RX ADMIN — SODIUM CHLORIDE SCH MLS/HR: 9 INJECTION, SOLUTION INTRAVENOUS at 17:18

## 2019-12-17 RX ADMIN — CALCIUM CARBONATE-VITAMIN D TAB 500 MG-200 UNIT SCH TAB: 500-200 TAB at 21:10

## 2019-12-17 RX ADMIN — INSULIN LISPRO PRN UNITS: 100 INJECTION, SOLUTION INTRAVENOUS; SUBCUTANEOUS at 07:05

## 2019-12-17 RX ADMIN — INSULIN LISPRO PRN UNITS: 100 INJECTION, SOLUTION INTRAVENOUS; SUBCUTANEOUS at 12:28

## 2019-12-17 RX ADMIN — CAPTOPRIL SCH MG: 25 TABLET ORAL at 09:00

## 2019-12-17 RX ADMIN — CALCIUM CARBONATE-VITAMIN D TAB 500 MG-200 UNIT SCH TAB: 500-200 TAB at 02:06

## 2019-12-17 RX ADMIN — CAPTOPRIL SCH MG: 25 TABLET ORAL at 21:09

## 2019-12-17 RX ADMIN — INSULIN LISPRO PRN UNITS: 100 INJECTION, SOLUTION INTRAVENOUS; SUBCUTANEOUS at 21:34

## 2019-12-17 RX ADMIN — DEXTROSE SCH MLS/HR: 50 INJECTION, SOLUTION INTRAVENOUS at 01:20

## 2019-12-17 RX ADMIN — FERROUS SULFATE TAB 325 MG (65 MG ELEMENTAL FE) SCH MG: 325 (65 FE) TAB at 21:09

## 2019-12-17 RX ADMIN — Medication SCH EACH: at 10:36

## 2019-12-17 RX ADMIN — INSULIN LISPRO PRN UNITS: 100 INJECTION, SOLUTION INTRAVENOUS; SUBCUTANEOUS at 18:07

## 2019-12-17 RX ADMIN — Medication SCH DEV: at 16:30

## 2019-12-17 RX ADMIN — PROPRANOLOL HYDROCHLORIDE SCH MG: 20 TABLET ORAL at 09:00

## 2019-12-17 RX ADMIN — Medication SCH DEV: at 21:06

## 2019-12-17 RX ADMIN — SEVELAMER CARBONATE SCH MG: 800 TABLET, FILM COATED ORAL at 13:52

## 2019-12-17 RX ADMIN — FERROUS SULFATE TAB 325 MG (65 MG ELEMENTAL FE) SCH MG: 325 (65 FE) TAB at 10:37

## 2019-12-17 RX ADMIN — DEXTROSE SCH MLS/HR: 50 INJECTION, SOLUTION INTRAVENOUS at 10:37

## 2019-12-17 RX ADMIN — DEXTROSE SCH MLS/HR: 50 INJECTION, SOLUTION INTRAVENOUS at 21:07

## 2019-12-17 RX ADMIN — Medication SCH DEV: at 12:24

## 2019-12-17 RX ADMIN — Medication SCH DEV: at 07:04

## 2019-12-17 RX ADMIN — SEVELAMER CARBONATE SCH MG: 800 TABLET, FILM COATED ORAL at 07:08

## 2019-12-17 RX ADMIN — Medication SCH TAB: at 10:36

## 2019-12-17 RX ADMIN — CALCIUM CARBONATE-VITAMIN D TAB 500 MG-200 UNIT SCH TAB: 500-200 TAB at 10:36

## 2019-12-17 RX ADMIN — FERROUS SULFATE TAB 325 MG (65 MG ELEMENTAL FE) SCH MG: 325 (65 FE) TAB at 02:05

## 2019-12-17 RX ADMIN — PROPRANOLOL HYDROCHLORIDE SCH MG: 20 TABLET ORAL at 21:10

## 2019-12-17 RX ADMIN — SODIUM CHLORIDE SCH MLS/HR: 9 INJECTION, SOLUTION INTRAVENOUS at 01:12

## 2019-12-17 NOTE — NUR
Pcp's appointment:



I called and spoke with Pallavi at [facility]cp [facility] [phone], address [address] 
[place] [address], made an appt on 12/24/19 at 9am. I provided patient with appt 
information.

-------------------------------------------------------------------------------

Addendum: 12/19/19 at 1625 by Elizabeth Reyes SS

-------------------------------------------------------------------------------

I called and spoke with Ana at pcp [facility] office [phone], address [address] 
[place] [address],  I cancelled appt made on 12/24/19 at 9am since patient's 
tentative discharge plan at this time is to be discharged to snf.

## 2019-12-17 NOTE — NUR
PATIENT AWAKE AND ALERT. DUE MEDICATIONS GIVEN AS ORDERED. NO APPARENT DISTRESS NOTED. WILL 
CONTINUE TO MONITOR.

## 2019-12-17 NOTE — NUR
PT IS ALERT AND VERBALLY RESPONSIVE. DAUGHTER AT BEDSIDE. DENIES ANY SOB, PAIN OR 
DISCOMFORT. CALL LIGHT WITHIN REACH. IV INTACT AND PATENT TO RIGHT HAND WITH IVF NS INFUSING 
@ 10ML/HR TO KVO.

## 2019-12-17 NOTE — NUR
PT BED WITH HOB ELEVATED. PT IS AWAKE AND ALERT. DENIES PAIN OR DISCOMFORT. NO S/S OF 
DISTRESS NOTED. BED IN LOW POSITION. CALL LIGHT WITHIN REACH.

## 2019-12-17 NOTE — NUR
ALL AM MEDS GIVEN AS ORDERED. PT IS LAYING IN BED, ALERT, AWAKE AND VERBALLY RESPONSIVE. 
NOTED IV TO RIGHT EJ LEAKING, ATTEMPTED TO FLUSH UNABLE TO SUCCESSFULLY FLUSH NS. WILL 
REINSERT IV.

## 2019-12-17 NOTE — NUR
PT IS IN STABLE CONDITION. NEEDS MET AT THIS TIME. CALL LIGHT WITHIN REACH. WILL ENDORSE TO 
NIGHT NURSE FOR CONTINUITY OF CARE.

## 2019-12-17 NOTE — NUR
PT COMPLAIN OF CONSTIPATION, AND PT REQUESTS TO HAVE RENAL MECH. SOFT CHOPPED INSTEAD OF 
PUREED. DR. NICHOLAS NOTIFIED.

## 2019-12-17 NOTE — NUR
RECEIVED REPORT FROM AM SHIFT NURSE. PATIENT ALERT AND ORIENTED X4. Singaporean SPEAKING. NO 
APPARENT DISTRESS NOTED. IV ON RIGHT HAND 22G RUNNING KVO. INTRODUCED SELF AND UPDATED 
BOARD. WITH LEFT ARM SHUNT. SAFETY OBSERVED. BED ON LOW POSITION. WILL CONTINUE TO MONITOR.

## 2019-12-17 NOTE — NUR
DISCHARGE [age] Y/O FEMALE PATIENT FROM HOME, WHO CAME IN DUE TO PERSISTENT HEADACHE X 1 DAY.  PAST 
MEDICAL HISTORY OF ESRD ON HD MON AND FRI, HTN, HYPOTHYROIDISM AND DM.  INITIAL DIAGNOSIS OF 
SEVERE SEPSIS, CHF.  CURRENT LABS INCLUDE WBC 2.6, H/H 7.7/24.0, BUN/CREA 40/4.3, NA/K 
138/3.4, LACTIC ACID ON ADMISSION 2.9-1.8 AND TROP 0.048.  ON ZOSYN IV.  PULMO CONSULT WITH 
DR. MULTANI FOR POSSIBLE PNA.  CARDIO CONSULT WITH DR. VILLALPANDO FOR POSSIBLE NEW ONSET 
FIBRILLATION.  NEPHRO CONSULT WITH DR. JOSHI FOR ESRD ON HD.  DC PLAN IS TO GO BACK HOME WHEN 
STABLE.

-------------------------------------------------------------------------------

Addendum: 19 at 0956 by Noy Hagen CM

-------------------------------------------------------------------------------

ISAIAS ATTEMPTED TO MEET WITH THE PATIENT AT THE BEDSIDE TO DISCUSS DC PLANNING.  PATIENT IS 
ASLEEP AND ON DIALYSIS.  CONTACTED PATIENT'S DAUGHTER CHENG AMARAL -[phone], NO 
ANSWER.  UNABLE TO LEAVE MESSAGE, NO VOICEMAIL SET UP.

-------------------------------------------------------------------------------

Addendum: 19 at 0918 by Noy Hagen CM

-------------------------------------------------------------------------------

ABLE TO CONTACT PATIENT'S DAUGHTER CHENG AMARAL -[phone], REGARDING DC PLAN.  SHE 
STATED THAT HER MOTHER WAS ONCE IN A NURSING HOME IN [place] AND DOES NOT WANT HER MOTHER TO 
GO BACK THERE.  WHEN ASKED THE NAME OF THE FACILITY, SHE SATED SHE COULD NOT REMEMBER IT.  
INFORMED HER THAT I LEFT BROCHURES WITH THE NURSE TO CHOOSE.  SHE STATED SHE WILL BE HERE 
AROUND 1030.  CM WILL FOLLOW UP.

-------------------------------------------------------------------------------

Addendum: 19 at 1246 by Noy Hagen 

-------------------------------------------------------------------------------

MET WITH THE PATIENT'S DAUGHTER AT THE BEDSIDE TO DISCUSS DC PLANNING.  I PROVIDED HER WITH 
BROCHURES FROM [facility], [facility] AND [facility].  SHE STATED SHE DOES 
NOT WANT HER MOTHER TO GO TO [employer].  SHE ALSO STATED SHE WANTS TO SPEAK TO THE DOCTOR 
FIRST BEFORE SHE CAN DECIDE.  I ASKED HER SHE WANTS ANY BROCHURE TO LOOK AT, SHE ANSWERED 
SHE IS OK WITH THE TWO FOR NOW.  PRIMARY RN MADE AWARE.

-------------------------------------------------------------------------------

Addendum: 19 at 1515 by Noy Hagen CM

-------------------------------------------------------------------------------

MET WITH THE PATIENT'S DAUGHTER CHENG TOGETHER WITH DR CERVANTES AT THE BEDSIDE TO 
DISCUSS DC PLANS TO SNF FOR IV ANTIBIOTIC AND PT.  ALL CONCERNS AND QUESTIONS ANSWERED.  
PROVIDED HER WITH OPTION FOR HOME HEALTH AS WELL.  EXPLAINED TO HER THAT WITH HOME HEALTH 
THEY CANNOT DO IV ANTIBIOTIC Q8H AND SOMEBODY FROM THE FAMILY SHOULD BE WILLING TO BE TAUGHT 
TO ADMINISTER THE IV MEDS.  SHE MENTIONED SHE HAS A NIECE WHO IS AN MA, SHE WILL ASK HER IF 
SHE IS ABLE TO DO.  PROVIDED HER OF MY CONTACT NUMBER IF SHE HAVE MADE A DECISION.

-------------------------------------------------------------------------------

Addendum: 19 at 1043 by Noy Hagen CM

-------------------------------------------------------------------------------

CONTACTED PATIENT'S PCP DR. LUCHO ZAMBRANO'S OFFICE -[phone], ABLE TO SPEAK TO 
YUSRA.  SHE STATED DR ZAMBRANO HAS NOT BEEN SNF'S FOR A YEAR NOW.  DR. CERVANTES MADE AWARE.



CONTACTED PATIENT'S DAUGHTER CHENG AMARAL, INFORMED HER THAT DR. ZAMBRANO DOES NOT DO SNF 
ANYMORE.  AND I ASKED HER IF HER NIECE WILL BE ABLE TO HANG THE ANTIBIOTIC.  SHE STATED HER 
NIECE CAN BUT SHE LIVES IN [place] AND SHE DOES NOT THINK THAT IT WILL BE NOT BE 
POSSIBLE FOR HER TO BE THERE EVERYDAY.  I ALSO ASKED HER IF SHE HAVE DECIDED ON ANY SNF.  
SHE STATED SHE IS CONSIDERING [facility], BUT SHE DOES NOT WANT THE PATIENT TO 
STAY MORE THAT 5 DAYS IN THE SNF.  DR. CERVANTES MADE AWARE.  PER DR. CERVANTES, 
ANTIBIOTIC IS FOR 7 DAYS TOTAL AND PATIENT SHOULDN'T BE STAYING IN THE SNF FOR MORE THAN 4 
DAYS.  CONTACTED PATIENT'S DAUGHTER AGAIN, NO ANSWER.  LEFT MESSAGE.

-------------------------------------------------------------------------------

Addendum: 19 at 1108 by Noy Hagen CM

-------------------------------------------------------------------------------

[facility] FO[facility]

-------------------------------------------------------------------------------

Addendum: 19 at 1436 by Noy Hagen CM

-------------------------------------------------------------------------------

CONTACTED PATIENT'S DAUGHTER CHENG, SHE CONFIRMED THAT SHE IS OK FOR HER MOTHER TO GO TO 
[facility] CARE.



SANCHEZ DOS SANTOS [facility], MADE AWARE.

-------------------------------------------------------------------------------

Addendum: 19 at 1527 by Noy Hagen CM

-------------------------------------------------------------------------------

CONTACTED [facility] -[phone].  ELIZABETH CONFIRMED THAT PATIENT GOES THERE 
MON AND FRI AT 2PM.



JESSIE DOS SANTOS [facility] MADE AWARE.  SHE STATED PATIENT CAN GO TO ROOM 31A, UNDER DR. DUNLAP TOMORROW.

-------------------------------------------------------------------------------

Addendum: 19 at 1407 by Noy Hagen CM

-------------------------------------------------------------------------------

PER JESSIE DOS SANTOS [facility], THEY ARE NOT ABLE TO ARRANGE TRANSPORT DUE TO PATIENT HAS MEDICARE PART B.



PER PRIMARY RICHY BARBER, PATIENT WILL HAVE DIALYSIS TODAY.  PER DR. CERVANTES, WILL DC PATIENT 
AFTER DIALYSIS.



MET WITH THE PATIENT AND PATIENT'S DAUGHTER (ANOTHER ONE) AT THE BEDSIDE TO DISCUSS DC PLAN 
FOR TODAY.   PATIENT'S DAUGHTER STATED THAT I HAVE TO SPEAK TO [employer] INSTEAD.  CONTACTED 
[employer], NO ANSWER.  LEFT MESSAGE.


-------------------------------------------------------------------------------

Addendum: 19 at 1515 by Noy Hagen CM

-------------------------------------------------------------------------------

RECEIVED A CALL FROM PATIENT'S DAUGHTER IN LAW SHANNON AYON, SHE STATED THAT THE FAMILY 
DECIDED THAT THEY WANT THE PATIENT HOME RATHER THAN SENDING HER TO THE NURSING HOME.  SHE 
ALSO STATED THAT [employer] IS WITH HER, I ASKED TO SPEAK TO [employer]  PATIENT'S DAUGHTER 
CHENG CONFIRMED.  SHE STATED THAT HER NIECE IS TEACHABLE TO ADMINISTER IV MEDICATIONS.  
DR. CERVANTES MADE AWARE.



RECEIVED ORDER FOR HOME HEALTH FOR IV ANTIBIOTICS.



CONTACTED PATIENT'S DAUGHTER CHENG, DISCUSSED THE DC PLAN.  SHE STATED SHE DOES NOT HAVE 
PREFERENCE FOR HOME HEALTH.  INFORMED HER THAT I WILL BE WORKING ON IT AND WILL GIVE HER 
CALL BACK AS SOON AS I GET AN ACCEPTING AGENCY.  

-------------------------------------------------------------------------------

Addendum: 19 at 1518 by Noy Hagen CM

-------------------------------------------------------------------------------

CONTACTED [facility], ABLE TO SPEAK TO GROVER (BALJIT), REGARDING INQUIRY.  SHE STATED 
TO GO AHEAD FAX OVER REFERRAL.

-------------------------------------------------------------------------------

Addendum: 19 at 1538 by Noy Hagen CM

-------------------------------------------------------------------------------

CONTACTED [facility], ABLE TO SPEAK TO GROVER.  SHE CONFIRMED THAT SHE RECEIVED THE 
REFERRAL AND THEY CAN ACCEPT THE PATIENT.



CONTACTED [facility] -[phone], ABLE TO SPEAK TO LUIS E WESLEY REGARDING 
REFERRAL.  HE STATED FAX REFERRAL [phone].  REFERRAL SENT.



CONTACTED PATIENT'S DAUGHTER AND INFORMED HER THAT [facility] IS ABLE TO ACCEPT THE 
PATIENT AND THEY WILL CONTACT HER.  I ALSO ASKED HER REGARDING HER NIECE'S NAME AND CONTACT 
INFO, HER NAME IS MERLENE [phone].  GROVER OF [facility] MADE AWARE.

-------------------------------------------------------------------------------

Addendum: 19 at 1539 by Noy Hagen CM

-------------------------------------------------------------------------------

RECEIVED A CALL FROM DAV[facility], SHE CONFIRMED THAT THEY RECEIVED THE REFERRAL. 
 SHE SAID SHE WILL REVIEW IT AND WILL GIVE ME A CALL BACK.

-------------------------------------------------------------------------------

Addendum: 19 at 1632 by Noy Hagen CM

-------------------------------------------------------------------------------

PER DAVID  [facility], THEY COULD NOT GET AUTHORIZATION FROM THE INSURANCE AND NOT 
SURE IF THEY CAN DISPENSE THE MEDICATION TOMORROW.  SHE STATED SHE WILL RUN AGAIN THE 
ELIGIBILITY AND WILL CALL ME BACK.  I TOLD HER THAT I WILL CONTACT ANOTHER INFUSION AGENCY.  
SHE STATED TO LET HER KNOW ONCE THEY ARE ABLE TO ACCEPT THE PATIENT.



CONTACTED [facility] -[phone], ABLE TO SPEAK TO CLAUDETTE (INTAKE).  SHE 
SAID TO FAX REFERRAL.

-------------------------------------------------------------------------------

Addendum: 19 at 1648 by Noy Hagen CM

-------------------------------------------------------------------------------

PER CLAUDETTE OF [facility], THEY ARE ABLE TO ACCEPT AND WILL DELIVER.  DR. CERVANTES MADE AWARE AND CHARGE NURSE MADE AWARE.

## 2019-12-17 NOTE — NUR
RECEIVED REPORT FROM NIGHT NURSE.  PT IS AWAKE, ALERT AND ORIENTED X4. IV INTACT AND PATENT 
TO RIGHT EJ. PT BED IN LOW POSITION, BED ALARM ON. LEFT AV SHUNT IN INTACT TO LEFT ARM. NO 
S/S OF DISTRESS NOTED. CALL LIGHT WITHIN REACH.

## 2019-12-17 NOTE — NUR
PATIENT HAS BEEN SCREENED AND CATEGORIZED AS MODERATE NUTRITION RISK. PATIENT WILL BE SEEN 
WITHIN 3-5 DAYS OF ADMISSION.



12/19/19 12/21/19



BRYAN LYNN RD

## 2019-12-17 NOTE — NUR
AWAKE, IN BED, RESPIRATION EVEN AND UNLABORED. NO COMPLAINT OF PAIN 0/10. WILL ENDORSED TO 
SHIFT NURSE FOR CONTINUITY OF CARE.

## 2019-12-17 NOTE — NUR
DIALYSIS DONE, ABLE TO TAKE OUT 2 LITERS OF FLUIDS AS PER DIALYSIS NURSE REPORT. BP - 
111/42, HR - 78. HOLD BP MEDICATIONS NOT GIVEN AFTER DIALYSIS, BP IN THE LOW SIDE.

## 2019-12-18 VITALS — DIASTOLIC BLOOD PRESSURE: 64 MMHG | SYSTOLIC BLOOD PRESSURE: 120 MMHG

## 2019-12-18 VITALS — SYSTOLIC BLOOD PRESSURE: 109 MMHG | DIASTOLIC BLOOD PRESSURE: 50 MMHG

## 2019-12-18 VITALS — DIASTOLIC BLOOD PRESSURE: 73 MMHG | SYSTOLIC BLOOD PRESSURE: 107 MMHG

## 2019-12-18 VITALS — DIASTOLIC BLOOD PRESSURE: 61 MMHG | SYSTOLIC BLOOD PRESSURE: 130 MMHG

## 2019-12-18 VITALS — DIASTOLIC BLOOD PRESSURE: 64 MMHG | SYSTOLIC BLOOD PRESSURE: 118 MMHG

## 2019-12-18 VITALS — SYSTOLIC BLOOD PRESSURE: 114 MMHG | DIASTOLIC BLOOD PRESSURE: 46 MMHG

## 2019-12-18 LAB
ANION GAP SERPL CALCULATED.3IONS-SCNC: 14.9 MMOL/L (ref 8–16)
BASOPHILS # BLD AUTO: 0 K/UL (ref 0–0.22)
BASOPHILS NFR BLD AUTO: 0.8 % (ref 0–2)
BUN SERPL-MCNC: 61 MG/DL (ref 7–18)
CHLORIDE SERPL-SCNC: 96 MMOL/L (ref 98–107)
CO2 SERPL-SCNC: 28.8 MMOL/L (ref 21–32)
CREAT SERPL-MCNC: 5.5 MG/DL (ref 0.6–1.3)
EOSINOPHIL # BLD AUTO: 0.2 K/UL (ref 0–0.4)
EOSINOPHIL NFR BLD AUTO: 5.3 % (ref 0–4)
ERYTHROCYTE [DISTWIDTH] IN BLOOD BY AUTOMATED COUNT: 19.9 % (ref 11.6–13.7)
GFR SERPL CREATININE-BSD FRML MDRD: 10 ML/MIN (ref 90–?)
GLUCOSE SERPL-MCNC: 131 MG/DL (ref 74–106)
HCT VFR BLD AUTO: 24.8 % (ref 36–48)
HGB BLD-MCNC: 7.9 G/DL (ref 12–16)
LYMPHOCYTES # BLD AUTO: 0.2 K/UL (ref 2.5–16.5)
LYMPHOCYTES NFR BLD AUTO: 6.5 % (ref 20.5–51.1)
MAGNESIUM SERPL-MCNC: 2.6 MG/DL (ref 1.8–2.4)
MCH RBC QN AUTO: 29 PG (ref 27–31)
MCHC RBC AUTO-ENTMCNC: 32 G/DL (ref 33–37)
MCV RBC AUTO: 89.5 FL (ref 80–94)
MONOCYTES # BLD AUTO: 0.3 K/UL (ref 0.8–1)
MONOCYTES NFR BLD AUTO: 9.7 % (ref 1.7–9.3)
NEUTROPHILS # BLD AUTO: 2.6 K/UL (ref 1.8–7.7)
NEUTROPHILS NFR BLD AUTO: 77.7 % (ref 42.2–75.2)
PHOSPHATE SERPL-MCNC: 3.6 MG/DL (ref 2.5–4.9)
PLATELET # BLD AUTO: 59 K/UL (ref 140–450)
POTASSIUM SERPL-SCNC: 4.7 MMOL/L (ref 3.5–5.1)
RBC # BLD AUTO: 2.77 MIL/UL (ref 4.2–5.4)
SODIUM SERPL-SCNC: 135 MMOL/L (ref 136–145)
WBC # BLD AUTO: 3.3 K/UL (ref 4.8–10.8)

## 2019-12-18 PROCEDURE — B548ZZA ULTRASONOGRAPHY OF SUPERIOR VENA CAVA, GUIDANCE: ICD-10-PCS | Performed by: GENERAL PRACTICE

## 2019-12-18 PROCEDURE — 02HV33Z INSERTION OF INFUSION DEVICE INTO SUPERIOR VENA CAVA, PERCUTANEOUS APPROACH: ICD-10-PCS | Performed by: GENERAL PRACTICE

## 2019-12-18 RX ADMIN — INSULIN LISPRO PRN UNITS: 100 INJECTION, SOLUTION INTRAVENOUS; SUBCUTANEOUS at 21:37

## 2019-12-18 RX ADMIN — SEVELAMER CARBONATE SCH MG: 800 TABLET, FILM COATED ORAL at 21:20

## 2019-12-18 RX ADMIN — FERROUS SULFATE TAB 325 MG (65 MG ELEMENTAL FE) SCH MG: 325 (65 FE) TAB at 09:00

## 2019-12-18 RX ADMIN — Medication SCH DEV: at 16:49

## 2019-12-18 RX ADMIN — Medication SCH EACH: at 09:00

## 2019-12-18 RX ADMIN — INSULIN LISPRO PRN UNITS: 100 INJECTION, SOLUTION INTRAVENOUS; SUBCUTANEOUS at 17:01

## 2019-12-18 RX ADMIN — SODIUM CHLORIDE SCH MLS/HR: 9 INJECTION, SOLUTION INTRAVENOUS at 17:06

## 2019-12-18 RX ADMIN — Medication SCH TAB: at 09:00

## 2019-12-18 RX ADMIN — CALCIUM CARBONATE-VITAMIN D TAB 500 MG-200 UNIT SCH TAB: 500-200 TAB at 21:21

## 2019-12-18 RX ADMIN — CALCIUM CARBONATE-VITAMIN D TAB 500 MG-200 UNIT SCH TAB: 500-200 TAB at 09:00

## 2019-12-18 RX ADMIN — PROPRANOLOL HYDROCHLORIDE SCH MG: 20 TABLET ORAL at 21:00

## 2019-12-18 RX ADMIN — FERROUS SULFATE TAB 325 MG (65 MG ELEMENTAL FE) SCH MG: 325 (65 FE) TAB at 21:20

## 2019-12-18 RX ADMIN — CAPTOPRIL SCH MG: 25 TABLET ORAL at 21:00

## 2019-12-18 RX ADMIN — PROPRANOLOL HYDROCHLORIDE SCH MG: 20 TABLET ORAL at 09:00

## 2019-12-18 RX ADMIN — MORPHINE SULFATE PRN MG: 2 INJECTION, SOLUTION INTRAMUSCULAR; INTRAVENOUS at 06:05

## 2019-12-18 RX ADMIN — Medication SCH DEV: at 21:25

## 2019-12-18 RX ADMIN — LEVOTHYROXINE SODIUM SCH MG: 75 TABLET ORAL at 05:34

## 2019-12-18 RX ADMIN — Medication SCH DEV: at 11:30

## 2019-12-18 RX ADMIN — Medication SCH DEV: at 05:49

## 2019-12-18 RX ADMIN — SEVELAMER CARBONATE SCH MG: 800 TABLET, FILM COATED ORAL at 05:35

## 2019-12-18 RX ADMIN — DEXTROSE SCH MLS/HR: 50 INJECTION, SOLUTION INTRAVENOUS at 11:20

## 2019-12-18 RX ADMIN — SEVELAMER CARBONATE SCH MG: 800 TABLET, FILM COATED ORAL at 13:20

## 2019-12-18 RX ADMIN — DEXTROSE SCH MLS/HR: 50 INJECTION, SOLUTION INTRAVENOUS at 21:23

## 2019-12-18 RX ADMIN — CAPTOPRIL SCH MG: 25 TABLET ORAL at 09:00

## 2019-12-18 NOTE — NUR
BLOOD SUGAR CHECKED WITH 151 RESULT, DUE MEDS ADMINISTERED WITH EDUCATION PROVIDED, PT 
REFUSED BP MEDS AT THIS TIME, BP-114/46, FAMILY MEMBERS AT BEDSIDE, ALL NEEDS ATTENDED.

## 2019-12-18 NOTE — NUR
PATIENT AWAKE IN BED. WITH COMPLAINT OF PAIN 10/10 ON ABDOMEN. MEDICATED PER PAIN SCALE. 
ALSO WITH COMPLAINT OF ANXIETY. PRN ATIVAN GIVEN AS ORDERED.

## 2019-12-18 NOTE — NUR
RECEIVED PT ON BED EATING DINNER, TOLERATING WELL, Lithuanian SPEAKING, AAOX4, ABLE TO MAKE 
NEEDS KNOWN, DENIES ANY PAIN AND NO SOB NOTED, RT UA PICC LINE IN PLACE, DRESSING DRY AND 
INTACT, PLAN OF CARE DISCUSSED, SAFETY MEASURES IN PLACE, CALL LIGHT WITHIN REACH.

## 2019-12-18 NOTE — NUR
PATIENT ASLEEP IN BED. VISIBLE CHEST RISE AND FALL NOTED. NO APPARENT DISTRESS NOTED. BED ON 
LOW POSITION. WILL CONTINUE TO MONITOR.

## 2019-12-18 NOTE — NUR
HD RN AT BEDSIDE ABOUT TO BEGIN HD. VITALS ARE STABLE PT APPEARS STABLE AND IN NO APPARENT 
DISTRESS. ALL SAFETY MEASURES ARE IN PLACE WILL CONTINUE TO MONITOR.

## 2019-12-18 NOTE — NUR
RECEIVED BEDSIDE REPORT FROM PM RN PT APPEARS STABLE AND IN NO APPARENT DISTRESS. ALL SAFETY 
MEASURES ARE IN PLACE PT IS ON TELE MONITORING. ALL SAFETY MEASURES ARE IN PLACE. WILL 
CONTINUE TO MONITOR.

## 2019-12-18 NOTE — NUR
FREQUENT ROUNDING ON PT PT APPEARS STABLE AND IN NO APPARENT DISTRESS. ALL SAFETY MEASURES 
ARE IN PLACE WILL CONTINUE TO MONITOR. HD IS CURRENTLY IN PLACE. HD RN AT BEDSIDE. WILL 
CONTINUE TO MONITOR.

## 2019-12-18 NOTE — NUR
FREQUENT ROUNDING ON PT PT APPEARS STABLE AND IN NO APPARENT DISTRESS. ALL SAFETY MEASURES 
ARE IN PLACE

## 2019-12-18 NOTE — NUR
HELD MORNING VITAMINS, AND FERROUS SULFATE. PT RECEIVED ATIVAN AND MORNING FROM PM RN AROUND 
0600. PT IS SLEEPING CNA TRIED FEEDING PT EARLIER PT WAS POCKETING FOOD. DO NOT WANT CHOKING 
RISK.

## 2019-12-18 NOTE — NUR
PT ANXIOUS AND RESTLESS, DENIES ANY PAIN, VITAL SIGNS TAKEN, BP SLIGHTLY ELEVATED, MEDICATED 
WITH ATIVAN IVP PRN, REPOSITIONED FOR COMFORT, IVF INFUSING WELL VIA RT UA PICC LINE, 
CONTINUE TO MONITOR CLOSELY.

## 2019-12-18 NOTE — NUR
S/P PICC LINE CHEST XRAY DONE. PICC LINE TO RIGHT UPPER ARM IN PLACE AND ACCESSIBLE, PER 
PICC LINE NURSE. PT IS AWAKE AND ALERT, VERBALLY RESPONSIVE. NO S/S OF DISTRESS NOTED.

## 2019-12-18 NOTE — NUR
CALLED AND SPOKE WITH PATIENTS DAUGHTER UPDATED HER ON THE PLAN TO PLACE A PICC LINE AND 
DISCHARGE THE PATIENT TO SNF. PT DAUGHTER STATED THAT SHE HAS ALREADY SPOKEN WITH CASE 
MANAGER AND THEY HAVE SCHEDULED TO MEET LATER TODAY AND DISCUSS OPTIONS PTS DAUGHTER STATED 
THAT THEY HAVE A BAD HISTORY WITH SNF IN PAST. UNABLE TO OBTAIN CONSENT FOR PICC LINE PT 
CURRENTLY SLEEPING. PT RECEIVING HD VITALS STABLE ON TELE MONITORING PT RECEIVED ATIVAN 
AROUND 0630 PT IS NOT ALERT ENOUGH CURRENTLY TO SIGN A CONSENT.

## 2019-12-18 NOTE — NUR
HELD MORNING BLOOD PRESSURE MEDICATIONS PT IS RECEIVING HD CURRENTLY. WILL INFUSING 0900 
ANTIBIOTIC WHEN HD IS COMPLETE.

## 2019-12-18 NOTE — NUR
PT DAUGHTER AT BEDSIDE. EXPLAINED PICC LINE PT AND DAUGHTER BOTH UNDERSTOOD AND SIGNED 
CONSENT FOR PICC LINE.

## 2019-12-18 NOTE — NUR
CHECKS DONE. PATIENT ASLEEP IN BED. NO APPARENT DISTRESS NOTED. VISIBLE CHEST RISE AND FALL 
NOTED. BED ON LOW POSITION. WILL CONTINUE TO MONITOR.

## 2019-12-19 VITALS — SYSTOLIC BLOOD PRESSURE: 153 MMHG | DIASTOLIC BLOOD PRESSURE: 65 MMHG

## 2019-12-19 VITALS — SYSTOLIC BLOOD PRESSURE: 144 MMHG | DIASTOLIC BLOOD PRESSURE: 66 MMHG

## 2019-12-19 VITALS — DIASTOLIC BLOOD PRESSURE: 65 MMHG | SYSTOLIC BLOOD PRESSURE: 150 MMHG

## 2019-12-19 VITALS — SYSTOLIC BLOOD PRESSURE: 135 MMHG | DIASTOLIC BLOOD PRESSURE: 66 MMHG

## 2019-12-19 VITALS — DIASTOLIC BLOOD PRESSURE: 45 MMHG | SYSTOLIC BLOOD PRESSURE: 141 MMHG

## 2019-12-19 LAB
ANION GAP SERPL CALCULATED.3IONS-SCNC: 14.6 MMOL/L (ref 8–16)
BUN SERPL-MCNC: 42 MG/DL (ref 7–18)
CHLORIDE SERPL-SCNC: 97 MMOL/L (ref 98–107)
CO2 SERPL-SCNC: 28.5 MMOL/L (ref 21–32)
CREAT SERPL-MCNC: 4.5 MG/DL (ref 0.6–1.3)
EOSINOPHIL NFR BLD MANUAL: 5 % (ref 0–4)
ERYTHROCYTE [DISTWIDTH] IN BLOOD BY AUTOMATED COUNT: 19.2 % (ref 11.6–13.7)
GFR SERPL CREATININE-BSD FRML MDRD: 12 ML/MIN (ref 90–?)
GLUCOSE SERPL-MCNC: 178 MG/DL (ref 74–106)
HCT VFR BLD AUTO: 24.1 % (ref 36–48)
HGB BLD-MCNC: 7.7 G/DL (ref 12–16)
LYMPHOCYTES NFR BLD MANUAL: 16 % (ref 20–46)
MAGNESIUM SERPL-MCNC: 2.4 MG/DL (ref 1.8–2.4)
MCH RBC QN AUTO: 29 PG (ref 27–31)
MCHC RBC AUTO-ENTMCNC: 32 G/DL (ref 33–37)
MCV RBC AUTO: 88.5 FL (ref 80–94)
MONOCYTES NFR BLD MANUAL: 7 % (ref 5–12)
PHOSPHATE SERPL-MCNC: 4 MG/DL (ref 2.5–4.9)
PLATELET # BLD AUTO: 51 K/UL (ref 140–450)
POTASSIUM SERPL-SCNC: 4.1 MMOL/L (ref 3.5–5.1)
RBC # BLD AUTO: 2.69 MIL/UL (ref 4.2–5.4)
SODIUM SERPL-SCNC: 136 MMOL/L (ref 136–145)
WBC # BLD AUTO: 2.3 K/UL (ref 4.8–10.8)

## 2019-12-19 RX ADMIN — Medication SCH DEV: at 12:05

## 2019-12-19 RX ADMIN — FERROUS SULFATE TAB 325 MG (65 MG ELEMENTAL FE) SCH MG: 325 (65 FE) TAB at 09:28

## 2019-12-19 RX ADMIN — DEXTROSE SCH MLS/HR: 50 INJECTION, SOLUTION INTRAVENOUS at 09:35

## 2019-12-19 RX ADMIN — INSULIN LISPRO PRN UNITS: 100 INJECTION, SOLUTION INTRAVENOUS; SUBCUTANEOUS at 20:45

## 2019-12-19 RX ADMIN — SODIUM CHLORIDE SCH MLS/HR: 9 INJECTION, SOLUTION INTRAVENOUS at 17:18

## 2019-12-19 RX ADMIN — SEVELAMER CARBONATE SCH MG: 800 TABLET, FILM COATED ORAL at 12:11

## 2019-12-19 RX ADMIN — Medication SCH DEV: at 16:54

## 2019-12-19 RX ADMIN — LEVOTHYROXINE SODIUM SCH MG: 75 TABLET ORAL at 05:43

## 2019-12-19 RX ADMIN — INSULIN LISPRO PRN UNITS: 100 INJECTION, SOLUTION INTRAVENOUS; SUBCUTANEOUS at 06:29

## 2019-12-19 RX ADMIN — PROPRANOLOL HYDROCHLORIDE SCH MG: 20 TABLET ORAL at 09:00

## 2019-12-19 RX ADMIN — CAPTOPRIL SCH MG: 25 TABLET ORAL at 20:47

## 2019-12-19 RX ADMIN — DEXTROSE SCH MLS/HR: 50 INJECTION, SOLUTION INTRAVENOUS at 20:42

## 2019-12-19 RX ADMIN — PROPRANOLOL HYDROCHLORIDE SCH MG: 20 TABLET ORAL at 20:47

## 2019-12-19 RX ADMIN — INSULIN LISPRO PRN UNITS: 100 INJECTION, SOLUTION INTRAVENOUS; SUBCUTANEOUS at 12:12

## 2019-12-19 RX ADMIN — CALCIUM CARBONATE-VITAMIN D TAB 500 MG-200 UNIT SCH TAB: 500-200 TAB at 20:40

## 2019-12-19 RX ADMIN — Medication SCH TAB: at 09:28

## 2019-12-19 RX ADMIN — CAPTOPRIL SCH MG: 25 TABLET ORAL at 09:00

## 2019-12-19 RX ADMIN — CALCIUM CARBONATE-VITAMIN D TAB 500 MG-200 UNIT SCH TAB: 500-200 TAB at 09:29

## 2019-12-19 RX ADMIN — FERROUS SULFATE TAB 325 MG (65 MG ELEMENTAL FE) SCH MG: 325 (65 FE) TAB at 20:41

## 2019-12-19 RX ADMIN — SEVELAMER CARBONATE SCH MG: 800 TABLET, FILM COATED ORAL at 05:44

## 2019-12-19 RX ADMIN — SEVELAMER CARBONATE SCH MG: 800 TABLET, FILM COATED ORAL at 20:50

## 2019-12-19 RX ADMIN — Medication SCH EACH: at 09:29

## 2019-12-19 RX ADMIN — Medication SCH DEV: at 06:39

## 2019-12-19 RX ADMIN — Medication SCH DEV: at 20:46

## 2019-12-19 RX ADMIN — INSULIN LISPRO PRN UNITS: 100 INJECTION, SOLUTION INTRAVENOUS; SUBCUTANEOUS at 17:35

## 2019-12-19 NOTE — NUR
Pt in high fowlers in bed, eating dinner. No c/o discomfort, no signs of distress. Right 
upper arm PICC intact & asymptomatic, NS @ 10ml/h. Daughter at bedside visiting pt.

## 2019-12-19 NOTE — NUR
BLOOD SUGAR CHECKED WITH 205 RESULT, RISS COVERAGE GIVEN, DUE MEDS ADMINISTERED WITH 
EDUCATION PROVIDE AND TRANSLATED BY DAUGHTER AT BEDSIDE, TOLERATED WELL, ALL NEEDS ATTENDED.

## 2019-12-19 NOTE — NUR
12/19/19 RD INITIAL ASSESSMENT COMPLETED



PLEASE REFER TO NUTRITION ASSESSMENT UNDER CARE ACTIVITY FOR ESTIMATED NUTRITIONAL NEEDS. 



1. CONTINUE [facility] SOFT RENAL AND CCHO 60GM DIET AS TOLERATED 

2. ENCOURAGE PO INTAKE

3. MEATS/PROTEIN SERVED WILL BE GROUNDED 

4. RD TO FOLLOW-UP 3-5 DAYS, MODERATE RISK 



BRYAN LYNN, RD

## 2019-12-19 NOTE — NUR
Pt in high fowlers in bed, aaox4. Pt consumed 1can of diet 7up, states she feels much 
better. No c/o abd pain at this time. Right upper arm PICC line intact & asymptomatic. Call 
light within reach. Daughter at bedside with pt.

## 2019-12-19 NOTE — NUR
RECEIVED PT SITTING ON HIGH FOWLERS POSITION EATING DINNER, PT PREFER ANOTHER FOOD THAT IS 
EASY TO CHEW, WILL ORDER CHICKEN [employer] SANDWICH, Luxembourger SPEAKING, AAOX4, ABLE TO MAKE NEEDS 
KNOWN, DENIES ANY PAIN AND NO SOB NOTED, IVF INFUSING WELL VIA RT UA PICC LINE, DRESSING DRY 
AND INTACT, PLAN OF CARE DISCUSSED TOGETHER WITH DAUGHTERS AT BEDSIDE, SAFETY MEASURES IN 
PLACE, CALL LIGHT WITHIN REACH.

## 2019-12-19 NOTE — NUR
Received report from PM nurseHuber. Patient was sleeping, responded to auditory stimuli, 
even breathing, patient on room air. No signs of distress, patient on fall precautions, bed 
alarm is on. PICC line upper right arm, intact and asymptomatic.

## 2019-12-19 NOTE — NUR
ROUNDS MADE, SEEN PT SLEEPING, NO DISTRESS NOTED, SIDE RAILS UP AND BED ALARM ON, MONITORED 
CLOSELY.

## 2019-12-19 NOTE — NUR
AM LABS DRAWN VIA PICC LINE WITH GOOD BLOOD RETURN, BLOOD SUGAR CHECKED WITH 171 RESULT, 
WILL COVER WITH SLIDING SCALE, DUE PO MEDS ADMINISTERED, TOLERATED WELL, MONITORED CLOSELY.

## 2019-12-19 NOTE — NUR
Patient received diet 7up. Patient was asleep when nurse walked into room. FLACC score 0. 
Daughter at bedside and will give soda when patient wakes up.

## 2019-12-19 NOTE — NUR
Patient complained of abdominal pain. 7/10 pain level. Dr. Abraham was notified. Will 
continue to monitor patient. Patient also complains of gas. Bowel percussion shows 
hyperresonance. Requested for diet 7up for comfort. FNS notified.

## 2019-12-19 NOTE — NUR
PT SLEEPING, EASILY AROUSABLE BUT DROWSY, VITAL SIGNS STABLE, DENIES ANY PAIN, NO SOB NOTED, 
MONITORED CLOSELY.

## 2019-12-19 NOTE — NUR
Heparin held d/t platelet count = 51. Dr Heath notified & agree to hold heparin. Pt 
shows no signs of active bleeding at this time.

## 2019-12-20 VITALS — SYSTOLIC BLOOD PRESSURE: 146 MMHG | DIASTOLIC BLOOD PRESSURE: 59 MMHG

## 2019-12-20 VITALS — SYSTOLIC BLOOD PRESSURE: 155 MMHG | DIASTOLIC BLOOD PRESSURE: 71 MMHG

## 2019-12-20 VITALS — SYSTOLIC BLOOD PRESSURE: 158 MMHG | DIASTOLIC BLOOD PRESSURE: 56 MMHG

## 2019-12-20 LAB
ANION GAP SERPL CALCULATED.3IONS-SCNC: 13.4 MMOL/L (ref 8–16)
BASOPHILS # BLD AUTO: 0 K/UL (ref 0–0.22)
BASOPHILS NFR BLD AUTO: 1.1 % (ref 0–2)
BUN SERPL-MCNC: 59 MG/DL (ref 7–18)
CHLORIDE SERPL-SCNC: 97 MMOL/L (ref 98–107)
CO2 SERPL-SCNC: 27.8 MMOL/L (ref 21–32)
CREAT SERPL-MCNC: 5.7 MG/DL (ref 0.6–1.3)
EOSINOPHIL # BLD AUTO: 0.2 K/UL (ref 0–0.4)
EOSINOPHIL NFR BLD AUTO: 8.3 % (ref 0–4)
ERYTHROCYTE [DISTWIDTH] IN BLOOD BY AUTOMATED COUNT: 18.7 % (ref 11.6–13.7)
GFR SERPL CREATININE-BSD FRML MDRD: 10 ML/MIN (ref 90–?)
GLUCOSE SERPL-MCNC: 92 MG/DL (ref 74–106)
HCT VFR BLD AUTO: 23.7 % (ref 36–48)
HGB BLD-MCNC: 7.6 G/DL (ref 12–16)
LYMPHOCYTES # BLD AUTO: 0.2 K/UL (ref 2.5–16.5)
LYMPHOCYTES NFR BLD AUTO: 8.8 % (ref 20.5–51.1)
MAGNESIUM SERPL-MCNC: 2.7 MG/DL (ref 1.8–2.4)
MCH RBC QN AUTO: 28 PG (ref 27–31)
MCHC RBC AUTO-ENTMCNC: 32 G/DL (ref 33–37)
MCV RBC AUTO: 88 FL (ref 80–94)
MONOCYTES # BLD AUTO: 0.2 K/UL (ref 0.8–1)
MONOCYTES NFR BLD AUTO: 10.4 % (ref 1.7–9.3)
NEUTROPHILS # BLD AUTO: 1.6 K/UL (ref 1.8–7.7)
NEUTROPHILS NFR BLD AUTO: 71.4 % (ref 42.2–75.2)
PHOSPHATE SERPL-MCNC: 5.3 MG/DL (ref 2.5–4.9)
PLATELET # BLD AUTO: 47 K/UL (ref 140–450)
POTASSIUM SERPL-SCNC: 4.2 MMOL/L (ref 3.5–5.1)
RBC # BLD AUTO: 2.69 MIL/UL (ref 4.2–5.4)
SODIUM SERPL-SCNC: 134 MMOL/L (ref 136–145)
WBC # BLD AUTO: 2.2 K/UL (ref 4.8–10.8)

## 2019-12-20 PROCEDURE — 5A1D70Z PERFORMANCE OF URINARY FILTRATION, INTERMITTENT, LESS THAN 6 HOURS PER DAY: ICD-10-PCS | Performed by: GENERAL PRACTICE

## 2019-12-20 RX ADMIN — DEXTROSE SCH MLS/HR: 50 INJECTION, SOLUTION INTRAVENOUS at 08:56

## 2019-12-20 RX ADMIN — MORPHINE SULFATE PRN MG: 2 INJECTION, SOLUTION INTRAMUSCULAR; INTRAVENOUS at 06:34

## 2019-12-20 RX ADMIN — Medication SCH DEV: at 16:30

## 2019-12-20 RX ADMIN — PROPRANOLOL HYDROCHLORIDE SCH MG: 20 TABLET ORAL at 08:57

## 2019-12-20 RX ADMIN — LEVOTHYROXINE SODIUM SCH MG: 75 TABLET ORAL at 05:34

## 2019-12-20 RX ADMIN — SEVELAMER CARBONATE SCH MG: 800 TABLET, FILM COATED ORAL at 17:00

## 2019-12-20 RX ADMIN — Medication SCH DEV: at 06:38

## 2019-12-20 RX ADMIN — FERROUS SULFATE TAB 325 MG (65 MG ELEMENTAL FE) SCH MG: 325 (65 FE) TAB at 08:57

## 2019-12-20 RX ADMIN — INSULIN LISPRO PRN UNITS: 100 INJECTION, SOLUTION INTRAVENOUS; SUBCUTANEOUS at 12:55

## 2019-12-20 RX ADMIN — SEVELAMER CARBONATE SCH MG: 800 TABLET, FILM COATED ORAL at 12:00

## 2019-12-20 RX ADMIN — SEVELAMER CARBONATE SCH MG: 800 TABLET, FILM COATED ORAL at 05:34

## 2019-12-20 RX ADMIN — SEVELAMER CARBONATE SCH MG: 800 TABLET, FILM COATED ORAL at 08:00

## 2019-12-20 RX ADMIN — Medication SCH EACH: at 08:57

## 2019-12-20 RX ADMIN — CALCIUM CARBONATE-VITAMIN D TAB 500 MG-200 UNIT SCH TAB: 500-200 TAB at 08:58

## 2019-12-20 RX ADMIN — CAPTOPRIL SCH MG: 25 TABLET ORAL at 08:56

## 2019-12-20 RX ADMIN — Medication SCH DEV: at 12:29

## 2019-12-20 RX ADMIN — Medication SCH TAB: at 08:57

## 2019-12-20 RX ADMIN — SODIUM CHLORIDE SCH MLS/HR: 9 INJECTION, SOLUTION INTRAVENOUS at 17:18

## 2019-12-20 NOTE — NUR
HEMODIALYSIS IS CURRENTLY IN PLACE, RESPONDING WELL TO HEMODIALYSIS, PATIENT IS RESTING IN 
BED, WILL CONTINUE TO MONITOR.

## 2019-12-20 NOTE — NUR
PATIENT WAS DISCHARGED FROM [facility] IN STABLE CONDITION TO HOME, 
TRANSPORTATION PROVIDED BY HER DAUGHTER. PATIENTS BELONGINGS ACCOUNTED FOR. PAPERWORK GIVEN 
PATIENT.

## 2019-12-20 NOTE — NUR
PATIENT IS RESTING IN BED, DAUGHTER AT BEDSIDE, DENIES PAIN AT THIS TIME. BED IN LOW 
POSITION, CALL LIGHT ON AND WITHIN REACH. DIALYSIS APPOINTMENT WAS CHANGED TO 1500 DUE TO 
EMERGENCY. WILL CONTINUE TO MONITOR.

## 2019-12-20 NOTE — NUR
RECEIVED REPORT FROM NIGHT SHIFT NURSE FOR CONTINUITY OF CARE. PATIENT BED IN LOW POSITION, 
RESTING COMFORTABLY, DENIES PAIN AT THIS TIME, DISCHARGE ORDER IN, WILL PREPARE FOR DISPO. 

-------------------------------------------------------------------------------

Addendum: 12/20/19 at 0800 by Cruzito Dooley RN

-------------------------------------------------------------------------------

WRONG PATIENT; REPORT RECEIVED FROM NIGHT SHIFT NURSE FOR CONTINUITY OF CARE, PATIENT HAS 
DIALYSIS SCHEDULED FOR THIS MORNING, PREPARING FOR DIALYSIS. PATIENT COMPLAINS OF PAIN, 
MEDICATIONS OFFERED. BED IN LOW POSITION, BREAKFAST SERVED, WILL CONTINUE TO MONITOR.

## 2019-12-20 NOTE — NUR
ROUNDS MADE, PATIENT IS SLEEPING IN BED, OBSERVED CHEST RISE AND FALL, NS RUNNING AT 10 
ML/HR. DIALYSIS SCHEDULED FOR 1230 AND PREPARE FOR DISPO AFTER. BED IN LOW POSITION, CALL 
LIGHT ON AND WITHIN REACH. WILL CONTINUE TO MONITOR.

## 2019-12-20 NOTE — NUR
PT COMPLAINING OF ABDOMINAL PAIN, PER PT NORCO IS NOT RELIEVING THE PAIN, MORPHINE 1MG IVP 
GIVEN PRN, MONITORED CLOSELY.

## 2019-12-20 NOTE — NUR
PT AWAKE WATCHING TV, AM LABS DRAWN VIA PICC LINE, MILK PROVIDED PER REQUEST, TOLERATED 
WELL, MONITORED CLOSELY.

## 2020-01-10 ENCOUNTER — HOSPITAL ENCOUNTER (EMERGENCY)
Dept: HOSPITAL 26 - MED | Age: 70
Discharge: LEFT BEFORE DISCHARGE | End: 2020-01-10
Payer: COMMERCIAL

## 2020-01-10 VITALS — HEIGHT: 59 IN | WEIGHT: 122.25 LBS | BODY MASS INDEX: 24.64 KG/M2

## 2020-01-10 VITALS — SYSTOLIC BLOOD PRESSURE: 143 MMHG | DIASTOLIC BLOOD PRESSURE: 97 MMHG

## 2020-01-10 VITALS — DIASTOLIC BLOOD PRESSURE: 97 MMHG | SYSTOLIC BLOOD PRESSURE: 143 MMHG

## 2020-01-10 DIAGNOSIS — N18.6: ICD-10-CM

## 2020-01-10 DIAGNOSIS — D63.1: ICD-10-CM

## 2020-01-10 DIAGNOSIS — I12.0: ICD-10-CM

## 2020-01-10 DIAGNOSIS — Z98.51: ICD-10-CM

## 2020-01-10 DIAGNOSIS — Z79.899: ICD-10-CM

## 2020-01-10 DIAGNOSIS — Z79.4: ICD-10-CM

## 2020-01-10 DIAGNOSIS — Z99.2: ICD-10-CM

## 2020-01-10 DIAGNOSIS — E11.22: Primary | ICD-10-CM

## 2020-01-10 DIAGNOSIS — E87.70: ICD-10-CM

## 2020-01-10 DIAGNOSIS — Z90.49: ICD-10-CM

## 2020-01-10 LAB
ALBUMIN FLD-MCNC: 3.1 G/DL (ref 3.4–5)
ANION GAP SERPL CALCULATED.3IONS-SCNC: 13.1 MMOL/L (ref 8–16)
AST SERPL-CCNC: 22 U/L (ref 15–37)
BASOPHILS # BLD AUTO: 0 K/UL (ref 0–0.22)
BASOPHILS NFR BLD AUTO: 0.9 % (ref 0–2)
BILIRUB SERPL-MCNC: 0.5 MG/DL (ref 0–1)
BUN SERPL-MCNC: 35 MG/DL (ref 7–18)
CHLORIDE SERPL-SCNC: 99 MMOL/L (ref 98–107)
CO2 SERPL-SCNC: 29.9 MMOL/L (ref 21–32)
CREAT SERPL-MCNC: 3.8 MG/DL (ref 0.6–1.3)
EOSINOPHIL # BLD AUTO: 0.1 K/UL (ref 0–0.4)
EOSINOPHIL NFR BLD AUTO: 7.3 % (ref 0–4)
ERYTHROCYTE [DISTWIDTH] IN BLOOD BY AUTOMATED COUNT: 18.7 % (ref 11.6–13.7)
GFR SERPL CREATININE-BSD FRML MDRD: 15 ML/MIN (ref 90–?)
GLUCOSE SERPL-MCNC: 382 MG/DL (ref 74–106)
HCT VFR BLD AUTO: 19.1 % (ref 36–48)
HGB BLD-MCNC: 6.2 G/DL (ref 12–16)
LYMPHOCYTES # BLD AUTO: 0.2 K/UL (ref 2.5–16.5)
LYMPHOCYTES NFR BLD AUTO: 11.3 % (ref 20.5–51.1)
MCH RBC QN AUTO: 28 PG (ref 27–31)
MCHC RBC AUTO-ENTMCNC: 33 G/DL (ref 33–37)
MCV RBC AUTO: 86 FL (ref 80–94)
MONOCYTES # BLD AUTO: 0.2 K/UL (ref 0.8–1)
MONOCYTES NFR BLD AUTO: 10.4 % (ref 1.7–9.3)
NEUTROPHILS # BLD AUTO: 1.4 K/UL (ref 1.8–7.7)
NEUTROPHILS NFR BLD AUTO: 70.1 % (ref 42.2–75.2)
PLATELET # BLD AUTO: 68 K/UL (ref 140–450)
POTASSIUM SERPL-SCNC: 3 MMOL/L (ref 3.5–5.1)
PROTHROMBIN TIME: 11.6 SECS (ref 10.8–13.4)
RBC # BLD AUTO: 2.22 MIL/UL (ref 4.2–5.4)
SODIUM SERPL-SCNC: 139 MMOL/L (ref 136–145)
WBC # BLD AUTO: 2 K/UL (ref 4.8–10.8)

## 2020-01-10 RX ADMIN — POTASSIUM CHLORIDE ONE MEQ: 750 TABLET, FILM COATED, EXTENDED RELEASE ORAL at 21:50

## 2020-01-10 NOTE — NUR
Patient does not wish to proceed with medical care recommended by DR. KEENAN.  
Patient given information related to possible complications, up to and 
including death, which could occur as a result of leaving hospital at this 
time.  Patient verbalizes understanding of risks involved leaving against 
medical advice.  Patient has signed AMA form. RANDYD MADE AWARE.

## 2020-01-10 NOTE — NUR
68 Y/O FEMALE BIB FAMILY REFERRED BY DR ROSEMARY VALDOVINOS [phone] C/O GEN 
WEAKNESS, ABDOMINAL PAIN, X 2 DAYS ON DIALYSIS MF. BLOOD SUGAR CHECK USING OWN 
PT MACHINE 272. FISTULA ON UPPER LEFT ARM NOTED. PAIN IS A 5/10 PAIN; 
NONRADIATING. ABLE TO AMBULATE WIHT ASSIST. DENIES N/V/D. PERRLA +3; A/OX4; 
BREATHING UNLABORED AND SYMMETRICAL; 97% ON RA; 17RR. ERMD MADE AWARE OF 
STATUS. SIDE RAILSX1. DAUGHTER AT BEDSIDE PLACED ON MONITOR. WILL CONTINUE TO 
MONITOR. 



HX: DM, UMBILICAL HERNIA, DIALYSIS M-F

RX:DENIES

NKDA

## 2020-06-22 ENCOUNTER — HOSPITAL ENCOUNTER (INPATIENT)
Dept: HOSPITAL 26 - MED | Age: 70
LOS: 7 days | Discharge: HOME | DRG: 368 | End: 2020-06-29
Attending: GENERAL PRACTICE | Admitting: GENERAL PRACTICE
Payer: COMMERCIAL

## 2020-06-22 VITALS — DIASTOLIC BLOOD PRESSURE: 31 MMHG | SYSTOLIC BLOOD PRESSURE: 123 MMHG

## 2020-06-22 VITALS — DIASTOLIC BLOOD PRESSURE: 48 MMHG | SYSTOLIC BLOOD PRESSURE: 79 MMHG

## 2020-06-22 VITALS — SYSTOLIC BLOOD PRESSURE: 82 MMHG | DIASTOLIC BLOOD PRESSURE: 37 MMHG

## 2020-06-22 VITALS — SYSTOLIC BLOOD PRESSURE: 155 MMHG | DIASTOLIC BLOOD PRESSURE: 88 MMHG

## 2020-06-22 VITALS — SYSTOLIC BLOOD PRESSURE: 79 MMHG | DIASTOLIC BLOOD PRESSURE: 41 MMHG

## 2020-06-22 VITALS — DIASTOLIC BLOOD PRESSURE: 40 MMHG | SYSTOLIC BLOOD PRESSURE: 103 MMHG

## 2020-06-22 VITALS — HEIGHT: 63 IN | BODY MASS INDEX: 24.63 KG/M2 | WEIGHT: 139 LBS

## 2020-06-22 VITALS — DIASTOLIC BLOOD PRESSURE: 42 MMHG | SYSTOLIC BLOOD PRESSURE: 145 MMHG

## 2020-06-22 VITALS — SYSTOLIC BLOOD PRESSURE: 143 MMHG | DIASTOLIC BLOOD PRESSURE: 58 MMHG

## 2020-06-22 VITALS — SYSTOLIC BLOOD PRESSURE: 158 MMHG | DIASTOLIC BLOOD PRESSURE: 63 MMHG

## 2020-06-22 VITALS — DIASTOLIC BLOOD PRESSURE: 43 MMHG | SYSTOLIC BLOOD PRESSURE: 139 MMHG

## 2020-06-22 VITALS — SYSTOLIC BLOOD PRESSURE: 151 MMHG | DIASTOLIC BLOOD PRESSURE: 63 MMHG

## 2020-06-22 VITALS — DIASTOLIC BLOOD PRESSURE: 51 MMHG | SYSTOLIC BLOOD PRESSURE: 80 MMHG

## 2020-06-22 VITALS — SYSTOLIC BLOOD PRESSURE: 150 MMHG | DIASTOLIC BLOOD PRESSURE: 59 MMHG

## 2020-06-22 VITALS — SYSTOLIC BLOOD PRESSURE: 149 MMHG | DIASTOLIC BLOOD PRESSURE: 65 MMHG

## 2020-06-22 VITALS — DIASTOLIC BLOOD PRESSURE: 62 MMHG | SYSTOLIC BLOOD PRESSURE: 131 MMHG

## 2020-06-22 VITALS — DIASTOLIC BLOOD PRESSURE: 70 MMHG | SYSTOLIC BLOOD PRESSURE: 158 MMHG

## 2020-06-22 VITALS — DIASTOLIC BLOOD PRESSURE: 62 MMHG | SYSTOLIC BLOOD PRESSURE: 152 MMHG

## 2020-06-22 VITALS — SYSTOLIC BLOOD PRESSURE: 130 MMHG | DIASTOLIC BLOOD PRESSURE: 65 MMHG

## 2020-06-22 VITALS — SYSTOLIC BLOOD PRESSURE: 158 MMHG | DIASTOLIC BLOOD PRESSURE: 67 MMHG

## 2020-06-22 VITALS — DIASTOLIC BLOOD PRESSURE: 51 MMHG | SYSTOLIC BLOOD PRESSURE: 131 MMHG

## 2020-06-22 VITALS — DIASTOLIC BLOOD PRESSURE: 56 MMHG | SYSTOLIC BLOOD PRESSURE: 126 MMHG

## 2020-06-22 VITALS — SYSTOLIC BLOOD PRESSURE: 142 MMHG | DIASTOLIC BLOOD PRESSURE: 57 MMHG

## 2020-06-22 DIAGNOSIS — E03.9: ICD-10-CM

## 2020-06-22 DIAGNOSIS — I86.8: ICD-10-CM

## 2020-06-22 DIAGNOSIS — N18.6: ICD-10-CM

## 2020-06-22 DIAGNOSIS — E11.69: ICD-10-CM

## 2020-06-22 DIAGNOSIS — N17.0: ICD-10-CM

## 2020-06-22 DIAGNOSIS — K70.30: ICD-10-CM

## 2020-06-22 DIAGNOSIS — E87.1: ICD-10-CM

## 2020-06-22 DIAGNOSIS — E83.39: ICD-10-CM

## 2020-06-22 DIAGNOSIS — K76.7: ICD-10-CM

## 2020-06-22 DIAGNOSIS — K76.6: ICD-10-CM

## 2020-06-22 DIAGNOSIS — D68.4: ICD-10-CM

## 2020-06-22 DIAGNOSIS — R16.1: ICD-10-CM

## 2020-06-22 DIAGNOSIS — G90.8: ICD-10-CM

## 2020-06-22 DIAGNOSIS — K72.90: ICD-10-CM

## 2020-06-22 DIAGNOSIS — D61.818: ICD-10-CM

## 2020-06-22 DIAGNOSIS — E43: ICD-10-CM

## 2020-06-22 DIAGNOSIS — I31.3: ICD-10-CM

## 2020-06-22 DIAGNOSIS — K64.8: ICD-10-CM

## 2020-06-22 DIAGNOSIS — I42.9: ICD-10-CM

## 2020-06-22 DIAGNOSIS — E83.51: ICD-10-CM

## 2020-06-22 DIAGNOSIS — G93.41: ICD-10-CM

## 2020-06-22 DIAGNOSIS — D69.59: ICD-10-CM

## 2020-06-22 DIAGNOSIS — D69.1: ICD-10-CM

## 2020-06-22 DIAGNOSIS — K62.5: ICD-10-CM

## 2020-06-22 DIAGNOSIS — E87.6: ICD-10-CM

## 2020-06-22 DIAGNOSIS — D64.9: ICD-10-CM

## 2020-06-22 DIAGNOSIS — Z99.2: ICD-10-CM

## 2020-06-22 DIAGNOSIS — I50.43: ICD-10-CM

## 2020-06-22 DIAGNOSIS — Z51.5: ICD-10-CM

## 2020-06-22 DIAGNOSIS — E11.22: ICD-10-CM

## 2020-06-22 DIAGNOSIS — Z20.828: ICD-10-CM

## 2020-06-22 DIAGNOSIS — D68.59: ICD-10-CM

## 2020-06-22 DIAGNOSIS — K59.00: ICD-10-CM

## 2020-06-22 DIAGNOSIS — I85.11: Primary | ICD-10-CM

## 2020-06-22 DIAGNOSIS — K31.89: ICD-10-CM

## 2020-06-22 DIAGNOSIS — R65.10: ICD-10-CM

## 2020-06-22 DIAGNOSIS — E11.40: ICD-10-CM

## 2020-06-22 DIAGNOSIS — E11.649: ICD-10-CM

## 2020-06-22 DIAGNOSIS — I13.2: ICD-10-CM

## 2020-06-22 DIAGNOSIS — R57.8: ICD-10-CM

## 2020-06-22 DIAGNOSIS — E83.42: ICD-10-CM

## 2020-06-22 LAB
ALBUMIN FLD-MCNC: 1.1 G/DL (ref 3.4–5)
AMYLASE SERPL-CCNC: 28 U/L (ref 25–115)
AMYLASE SERPL-CCNC: 31 U/L (ref 25–115)
ANION GAP SERPL CALCULATED.3IONS-SCNC: 14.4 MMOL/L (ref 8–16)
AST SERPL-CCNC: 20 U/L (ref 15–37)
BASOPHILS # BLD AUTO: 0 K/UL (ref 0–0.22)
BASOPHILS # BLD AUTO: 0.1 K/UL (ref 0–0.22)
BASOPHILS NFR BLD AUTO: 2.3 % (ref 0–2)
BASOPHILS NFR BLD AUTO: 3.2 % (ref 0–2)
BILIRUB SERPL-MCNC: 0.4 MG/DL (ref 0–1)
BUN SERPL-MCNC: 45 MG/DL (ref 7–18)
CHLORIDE SERPL-SCNC: 114 MMOL/L (ref 98–107)
CO2 SERPL-SCNC: 17 MMOL/L (ref 21–32)
CREAT SERPL-MCNC: 2.8 MG/DL (ref 0.6–1.3)
EOSINOPHIL # BLD AUTO: 0.2 K/UL (ref 0–0.4)
EOSINOPHIL # BLD AUTO: 0.3 K/UL (ref 0–0.4)
EOSINOPHIL NFR BLD AUTO: 11 % (ref 0–4)
EOSINOPHIL NFR BLD AUTO: 9 % (ref 0–4)
ERYTHROCYTE [DISTWIDTH] IN BLOOD BY AUTOMATED COUNT: 19.1 % (ref 11.6–13.7)
ERYTHROCYTE [DISTWIDTH] IN BLOOD BY AUTOMATED COUNT: 19.7 % (ref 11.6–13.7)
GFR SERPL CREATININE-BSD FRML MDRD: 22 ML/MIN (ref 90–?)
GLUCOSE SERPL-MCNC: 159 MG/DL (ref 74–106)
HCT VFR BLD AUTO: 10.5 % (ref 36–48)
HCT VFR BLD AUTO: 15.5 % (ref 36–48)
HGB BLD-MCNC: 3.3 G/DL (ref 12–16)
HGB BLD-MCNC: 5.1 G/DL (ref 12–16)
IRON SERPL-MCNC: 52 UG/DL (ref 50–175)
LDH SERPL-CCNC: 114 U/L (ref 81–234)
LIPASE SERPL-CCNC: 127 U/L (ref 73–393)
LIPASE SERPL-CCNC: 145 U/L (ref 73–393)
LYMPHOCYTES # BLD AUTO: 0.2 K/UL (ref 2.5–16.5)
LYMPHOCYTES # BLD AUTO: 0.4 K/UL (ref 2.5–16.5)
LYMPHOCYTES NFR BLD AUTO: 10.2 % (ref 20.5–51.1)
LYMPHOCYTES NFR BLD AUTO: 13.2 % (ref 20.5–51.1)
MAGNESIUM SERPL-MCNC: 1.5 MG/DL (ref 1.8–2.4)
MCH RBC QN AUTO: 29 PG (ref 27–31)
MCH RBC QN AUTO: 29 PG (ref 27–31)
MCHC RBC AUTO-ENTMCNC: 32 G/DL (ref 33–37)
MCHC RBC AUTO-ENTMCNC: 33 G/DL (ref 33–37)
MCV RBC AUTO: 90.2 FL (ref 80–94)
MCV RBC AUTO: 91.8 FL (ref 80–94)
MONOCYTES # BLD AUTO: 0.3 K/UL (ref 0.8–1)
MONOCYTES # BLD AUTO: 0.4 K/UL (ref 0.8–1)
MONOCYTES NFR BLD AUTO: 14.3 % (ref 1.7–9.3)
MONOCYTES NFR BLD AUTO: 15.1 % (ref 1.7–9.3)
NEUTROPHILS # BLD AUTO: 1.1 K/UL (ref 1.8–7.7)
NEUTROPHILS # BLD AUTO: 1.8 K/UL (ref 1.8–7.7)
NEUTROPHILS NFR BLD AUTO: 60.3 % (ref 42.2–75.2)
NEUTROPHILS NFR BLD AUTO: 61.4 % (ref 42.2–75.2)
PHOSPHATE SERPL-MCNC: 4.4 MG/DL (ref 2.5–4.9)
PLATELET # BLD AUTO: 42 K/UL (ref 140–450)
PLATELET # BLD AUTO: 65 K/UL (ref 140–450)
POTASSIUM SERPL-SCNC: 3.4 MMOL/L (ref 3.5–5.1)
PROTHROMBIN TIME: 17.7 SECS (ref 10.8–13.4)
RBC # BLD AUTO: 1.14 MIL/UL (ref 4.2–5.4)
RBC # BLD AUTO: 1.72 MIL/UL (ref 4.2–5.4)
SODIUM SERPL-SCNC: 142 MMOL/L (ref 136–145)
TIBC SERPL-MCNC: 168 UG/DL (ref 250–450)
TSH SERPL DL<=0.05 MIU/L-ACNC: 13.69 UIU/ML (ref 0.34–3.74)
WBC # BLD AUTO: 1.8 K/UL (ref 4.8–10.8)
WBC # BLD AUTO: 3.1 K/UL (ref 4.8–10.8)

## 2020-06-22 PROCEDURE — C9113 INJ PANTOPRAZOLE SODIUM, VIA: HCPCS

## 2020-06-22 PROCEDURE — P9017 PLASMA 1 DONOR FRZ W/IN 8 HR: HCPCS

## 2020-06-22 PROCEDURE — C1751 CATH, INF, PER/CENT/MIDLINE: HCPCS

## 2020-06-22 PROCEDURE — P9016 RBC LEUKOCYTES REDUCED: HCPCS

## 2020-06-22 PROCEDURE — P9035 PLATELET PHERES LEUKOREDUCED: HCPCS

## 2020-06-22 RX ADMIN — Medication SCH DEV: at 21:00

## 2020-06-22 RX ADMIN — CALCIUM CARBONATE-VITAMIN D TAB 500 MG-200 UNIT SCH TAB: 500-200 TAB at 21:20

## 2020-06-22 RX ADMIN — SODIUM CHLORIDE SCH MLS/HR: 0.9 INJECTION, SOLUTION INTRAVENOUS at 17:30

## 2020-06-22 RX ADMIN — FERROUS SULFATE TAB 325 MG (65 MG ELEMENTAL FE) SCH MG: 325 (65 FE) TAB at 21:19

## 2020-06-22 RX ADMIN — PANTOPRAZOLE SODIUM SCH MLS/HR: 40 INJECTION, POWDER, FOR SOLUTION INTRAVENOUS at 17:30

## 2020-06-22 NOTE — NUR
RECEIVED PATIENT TRANSFERRED FROM ER VIA GURNEY, REPORT OBTAINED AT BEDSIDE, PT IS AAOX4, 
Slovak SPEAKING, ABLE TO FOLLOW COMMANDS, VSS, DENIES PAIN, NO S/S OF DISTRESS, CLEAR LUNG 
SOUNDS MOHIT. DENIES ANY CHEST PAIN, SB ON CARDIAC MONITOR, TENDER ABDOMEN WITH ACTIVE BOWEL 
SOUNDS, BRIGHT RED BLOODY STOOL NOTED, NPO EXCEPT MEDS,  ANURIC, INCONTINENT WITH BOWELS, 
ABLE TO MOVE ALL EXTREMITIES, GENERALIZED WEAKNESS NOTED,  SKIN IS WARM AND DRY TO TOUCH, IV 
SITE TO RIGHT HAND, 20GA, AND RIGHT AC 20GA, AND RIGHT EJ 18GA, PATENT AND SL, AV SHUNT TO 
LEFT ARM, ORIENTED PATIENT ON ICU SETTING, SAFETY MEASURES IN PLACE, CALL LIGHT WITHIN 
REACH, WILL CONTINUE TO MONITOR.

## 2020-06-22 NOTE — NUR
71 Y/O FEMALE BIBA ALS FOR BRIGHT RED BLOOD IN STOOL. PER EMS PT WAS LAST SEEN 
NORMAL BY FAMILY 2 HRS PRIOR TO ARRIVAL. PT C/O ABD PAIN AT THIS TIME. GUARDING 
ABD AND MOANING IN PAIN. ABD SOFT, ROUND, TENDER TO PALP. BOWEL SOUNDS 
HYPERACTIVE X 4 QUAD. SITTING UPRIGHT AWAKE AND ALERT, WITH MILD CONFUSION. 
LAST RECEIVED DIALYSIS ON SATURDAY. PT PRESENTED WITH 20G TO RT HAND. PT HAS LT 
HAND RESTRICTION DUE TO DIALYSIS SHUNT. PLACED ON CARDIAC MONITOR, PULSE OX, 
AND BP CUFF PLACED. 



MEDHX: ESRD, DM

## 2020-06-22 NOTE — NUR
Patient BIBA ALS accompanied by Zafar BECERRA, transferred to bed 4. RN 
evaluating patient at bedside.

## 2020-06-22 NOTE — NUR
LOW BP NOTED, WILL STARTED LEVOPHED AS ORDERED, PATIENT IS MORE ALERT, OBTAINED HD CONSENT, 
MARTIN SIMS NOTIFIED.

## 2020-06-22 NOTE — NUR
PT AWAKE SITTING UP IN BED, AAOX4 Armenian SPEAKING ONLY. PT ABLE TO MOVE ALL EXTREMITIES; 
FOLLOWING COMMANDS. SR 60S NO EDEMA NOTED, LEFT AVFISTULA PRESENT + BRUIT/ THRILL LUNGS 
DIMINISHED ON NC 2L DENIES SOB/CP. ABD SOFT ROUND, HERNIA PRESENT; ACTIVE BOWEL SOUNDS, 
BLOOD TINGED STOOL NOTED. PT ANURIC. SKIN INTACT, DISCOLORATION NOTED TO BILATERAL ARMS. PT 
ON PROTONIX DRIP AND SANDOSTATIN DRIP, LEVOPHED DRIP @ 5 MCG/KG/MIN, PERIPHERAL IV TO R FA 
X2 20 G, R EJ 18G NOTED. BED LOCKED IN LOWEST POSITION, HD TREATMENT SCHEDULED FOR PM. WILL 
CONTINUE TO OBSERVE.

## 2020-06-22 NOTE — NUR
Patient will be admitted to care of DR UGALDE. Admited to ICU.  Will go to room 
ICU-05. Belongings list completed.  Report to RICHY INGRAM.

## 2020-06-22 NOTE — NUR
EMERGENT BLOOD TRANSFUSION INITIATED BY DR PHIPPS. PT TO RECEIVED O- BLOOD. 
VERIFIED BY RICHY LENZ.

## 2020-06-23 VITALS — SYSTOLIC BLOOD PRESSURE: 100 MMHG | DIASTOLIC BLOOD PRESSURE: 46 MMHG

## 2020-06-23 VITALS — DIASTOLIC BLOOD PRESSURE: 54 MMHG | SYSTOLIC BLOOD PRESSURE: 128 MMHG

## 2020-06-23 VITALS — SYSTOLIC BLOOD PRESSURE: 114 MMHG | DIASTOLIC BLOOD PRESSURE: 55 MMHG

## 2020-06-23 VITALS — DIASTOLIC BLOOD PRESSURE: 41 MMHG | SYSTOLIC BLOOD PRESSURE: 96 MMHG

## 2020-06-23 VITALS — SYSTOLIC BLOOD PRESSURE: 91 MMHG | DIASTOLIC BLOOD PRESSURE: 49 MMHG

## 2020-06-23 VITALS — DIASTOLIC BLOOD PRESSURE: 42 MMHG | SYSTOLIC BLOOD PRESSURE: 91 MMHG

## 2020-06-23 VITALS — DIASTOLIC BLOOD PRESSURE: 38 MMHG | SYSTOLIC BLOOD PRESSURE: 79 MMHG

## 2020-06-23 VITALS — DIASTOLIC BLOOD PRESSURE: 64 MMHG | SYSTOLIC BLOOD PRESSURE: 134 MMHG

## 2020-06-23 VITALS — DIASTOLIC BLOOD PRESSURE: 31 MMHG | SYSTOLIC BLOOD PRESSURE: 77 MMHG

## 2020-06-23 VITALS — SYSTOLIC BLOOD PRESSURE: 93 MMHG | DIASTOLIC BLOOD PRESSURE: 43 MMHG

## 2020-06-23 VITALS — DIASTOLIC BLOOD PRESSURE: 41 MMHG | SYSTOLIC BLOOD PRESSURE: 94 MMHG

## 2020-06-23 VITALS — DIASTOLIC BLOOD PRESSURE: 56 MMHG | SYSTOLIC BLOOD PRESSURE: 112 MMHG

## 2020-06-23 VITALS — DIASTOLIC BLOOD PRESSURE: 49 MMHG | SYSTOLIC BLOOD PRESSURE: 103 MMHG

## 2020-06-23 VITALS — DIASTOLIC BLOOD PRESSURE: 43 MMHG | SYSTOLIC BLOOD PRESSURE: 90 MMHG

## 2020-06-23 VITALS — DIASTOLIC BLOOD PRESSURE: 47 MMHG | SYSTOLIC BLOOD PRESSURE: 95 MMHG

## 2020-06-23 VITALS — SYSTOLIC BLOOD PRESSURE: 91 MMHG | DIASTOLIC BLOOD PRESSURE: 36 MMHG

## 2020-06-23 VITALS — DIASTOLIC BLOOD PRESSURE: 47 MMHG | SYSTOLIC BLOOD PRESSURE: 100 MMHG

## 2020-06-23 VITALS — SYSTOLIC BLOOD PRESSURE: 93 MMHG | DIASTOLIC BLOOD PRESSURE: 62 MMHG

## 2020-06-23 VITALS — DIASTOLIC BLOOD PRESSURE: 39 MMHG | SYSTOLIC BLOOD PRESSURE: 129 MMHG

## 2020-06-23 VITALS — SYSTOLIC BLOOD PRESSURE: 99 MMHG | DIASTOLIC BLOOD PRESSURE: 45 MMHG

## 2020-06-23 VITALS — DIASTOLIC BLOOD PRESSURE: 45 MMHG | SYSTOLIC BLOOD PRESSURE: 90 MMHG

## 2020-06-23 VITALS — DIASTOLIC BLOOD PRESSURE: 62 MMHG | SYSTOLIC BLOOD PRESSURE: 95 MMHG

## 2020-06-23 VITALS — SYSTOLIC BLOOD PRESSURE: 94 MMHG | DIASTOLIC BLOOD PRESSURE: 44 MMHG

## 2020-06-23 VITALS — DIASTOLIC BLOOD PRESSURE: 82 MMHG | SYSTOLIC BLOOD PRESSURE: 118 MMHG

## 2020-06-23 VITALS — DIASTOLIC BLOOD PRESSURE: 74 MMHG | SYSTOLIC BLOOD PRESSURE: 141 MMHG

## 2020-06-23 VITALS — DIASTOLIC BLOOD PRESSURE: 78 MMHG | SYSTOLIC BLOOD PRESSURE: 108 MMHG

## 2020-06-23 VITALS — SYSTOLIC BLOOD PRESSURE: 91 MMHG | DIASTOLIC BLOOD PRESSURE: 41 MMHG

## 2020-06-23 VITALS — SYSTOLIC BLOOD PRESSURE: 89 MMHG | DIASTOLIC BLOOD PRESSURE: 48 MMHG

## 2020-06-23 VITALS — SYSTOLIC BLOOD PRESSURE: 90 MMHG | DIASTOLIC BLOOD PRESSURE: 44 MMHG

## 2020-06-23 VITALS — SYSTOLIC BLOOD PRESSURE: 77 MMHG | DIASTOLIC BLOOD PRESSURE: 31 MMHG

## 2020-06-23 VITALS — DIASTOLIC BLOOD PRESSURE: 47 MMHG | SYSTOLIC BLOOD PRESSURE: 94 MMHG

## 2020-06-23 VITALS — DIASTOLIC BLOOD PRESSURE: 40 MMHG | SYSTOLIC BLOOD PRESSURE: 89 MMHG

## 2020-06-23 VITALS — DIASTOLIC BLOOD PRESSURE: 39 MMHG | SYSTOLIC BLOOD PRESSURE: 88 MMHG

## 2020-06-23 VITALS — DIASTOLIC BLOOD PRESSURE: 44 MMHG | SYSTOLIC BLOOD PRESSURE: 93 MMHG

## 2020-06-23 VITALS — SYSTOLIC BLOOD PRESSURE: 112 MMHG | DIASTOLIC BLOOD PRESSURE: 68 MMHG

## 2020-06-23 VITALS — SYSTOLIC BLOOD PRESSURE: 99 MMHG | DIASTOLIC BLOOD PRESSURE: 51 MMHG

## 2020-06-23 VITALS — SYSTOLIC BLOOD PRESSURE: 93 MMHG | DIASTOLIC BLOOD PRESSURE: 46 MMHG

## 2020-06-23 VITALS — DIASTOLIC BLOOD PRESSURE: 47 MMHG | SYSTOLIC BLOOD PRESSURE: 91 MMHG

## 2020-06-23 VITALS — DIASTOLIC BLOOD PRESSURE: 51 MMHG | SYSTOLIC BLOOD PRESSURE: 109 MMHG

## 2020-06-23 VITALS — DIASTOLIC BLOOD PRESSURE: 42 MMHG | SYSTOLIC BLOOD PRESSURE: 101 MMHG

## 2020-06-23 VITALS — DIASTOLIC BLOOD PRESSURE: 60 MMHG | SYSTOLIC BLOOD PRESSURE: 160 MMHG

## 2020-06-23 VITALS — DIASTOLIC BLOOD PRESSURE: 50 MMHG | SYSTOLIC BLOOD PRESSURE: 100 MMHG

## 2020-06-23 LAB
ANION GAP SERPL CALCULATED.3IONS-SCNC: 11.5 MMOL/L (ref 8–16)
BASOPHILS # BLD AUTO: 0 K/UL (ref 0–0.22)
BASOPHILS # BLD AUTO: 0 K/UL (ref 0–0.22)
BASOPHILS NFR BLD AUTO: 0.6 % (ref 0–2)
BASOPHILS NFR BLD AUTO: 1.4 % (ref 0–2)
BUN SERPL-MCNC: 35 MG/DL (ref 7–18)
CHLORIDE SERPL-SCNC: 99 MMOL/L (ref 98–107)
CHOLEST/HDLC SERPL: 2.6 {RATIO} (ref 1–4.5)
CO2 SERPL-SCNC: 30.1 MMOL/L (ref 21–32)
CREAT SERPL-MCNC: 3.2 MG/DL (ref 0.6–1.3)
EOSINOPHIL # BLD AUTO: 0.2 K/UL (ref 0–0.4)
EOSINOPHIL # BLD AUTO: 0.3 K/UL (ref 0–0.4)
EOSINOPHIL NFR BLD AUTO: 7.9 % (ref 0–4)
EOSINOPHIL NFR BLD AUTO: 9.6 % (ref 0–4)
ERYTHROCYTE [DISTWIDTH] IN BLOOD BY AUTOMATED COUNT: 16.5 % (ref 11.6–13.7)
ERYTHROCYTE [DISTWIDTH] IN BLOOD BY AUTOMATED COUNT: 16.7 % (ref 11.6–13.7)
FERRITIN SERPL-MCNC: 182 NG/ML (ref 15–150)
FOLATE SERPL-MCNC: 11.1 NG/ML (ref 3–?)
GFR SERPL CREATININE-BSD FRML MDRD: 18 ML/MIN (ref 90–?)
GLUCOSE SERPL-MCNC: 170 MG/DL (ref 74–106)
HCT VFR BLD AUTO: 22.1 % (ref 36–48)
HCT VFR BLD AUTO: 22.2 % (ref 36–48)
HDLC SERPL-MCNC: 50 MG/DL (ref 40–60)
HGB BLD-MCNC: 7.4 G/DL (ref 12–16)
HGB BLD-MCNC: 7.5 G/DL (ref 12–16)
LDLC SERPL CALC-MCNC: 66 MG/DL (ref 60–100)
LYMPHOCYTES # BLD AUTO: 0.2 K/UL (ref 2.5–16.5)
LYMPHOCYTES # BLD AUTO: 0.2 K/UL (ref 2.5–16.5)
LYMPHOCYTES NFR BLD AUTO: 7.1 % (ref 20.5–51.1)
LYMPHOCYTES NFR BLD AUTO: 7.7 % (ref 20.5–51.1)
MCH RBC QN AUTO: 30 PG (ref 27–31)
MCH RBC QN AUTO: 30 PG (ref 27–31)
MCHC RBC AUTO-ENTMCNC: 33 G/DL (ref 33–37)
MCHC RBC AUTO-ENTMCNC: 34 G/DL (ref 33–37)
MCV RBC AUTO: 88.5 FL (ref 80–94)
MCV RBC AUTO: 89.7 FL (ref 80–94)
MONOCYTES # BLD AUTO: 0.3 K/UL (ref 0.8–1)
MONOCYTES # BLD AUTO: 0.3 K/UL (ref 0.8–1)
MONOCYTES NFR BLD AUTO: 10.3 % (ref 1.7–9.3)
MONOCYTES NFR BLD AUTO: 8.8 % (ref 1.7–9.3)
NEUTROPHILS # BLD AUTO: 2.1 K/UL (ref 1.8–7.7)
NEUTROPHILS # BLD AUTO: 2.3 K/UL (ref 1.8–7.7)
NEUTROPHILS NFR BLD AUTO: 71.6 % (ref 42.2–75.2)
NEUTROPHILS NFR BLD AUTO: 75 % (ref 42.2–75.2)
PLATELET # BLD AUTO: 52 K/UL (ref 140–450)
PLATELET # BLD AUTO: 53 K/UL (ref 140–450)
POTASSIUM SERPL-SCNC: 3.6 MMOL/L (ref 3.5–5.1)
RBC # BLD AUTO: 2.48 MIL/UL (ref 4.2–5.4)
RBC # BLD AUTO: 2.49 MIL/UL (ref 4.2–5.4)
SODIUM SERPL-SCNC: 137 MMOL/L (ref 136–145)
TRANSFERRIN SERPL-MCNC: 142 MG/DL (ref 192–364)
TRIGL SERPL-MCNC: 71 MG/DL (ref 30–150)
VIT B12 SERPL-MCNC: 972 PG/ML (ref 232–1245)
WBC # BLD AUTO: 2.9 K/UL (ref 4.8–10.8)
WBC # BLD AUTO: 3 K/UL (ref 4.8–10.8)

## 2020-06-23 PROCEDURE — 06L38CZ OCCLUSION OF ESOPHAGEAL VEIN WITH EXTRALUMINAL DEVICE, VIA NATURAL OR ARTIFICIAL OPENING ENDOSCOPIC: ICD-10-PCS

## 2020-06-23 PROCEDURE — 30233P1 TRANSFUSION OF NONAUTOLOGOUS FROZEN RED CELLS INTO PERIPHERAL VEIN, PERCUTANEOUS APPROACH: ICD-10-PCS

## 2020-06-23 RX ADMIN — PROPRANOLOL HYDROCHLORIDE SCH MG: 20 TABLET ORAL at 13:00

## 2020-06-23 RX ADMIN — LACTULOSE SCH GM: 10 SOLUTION ORAL at 13:00

## 2020-06-23 RX ADMIN — GABAPENTIN SCH MG: 100 CAPSULE ORAL at 21:00

## 2020-06-23 RX ADMIN — CALCIUM CARBONATE-VITAMIN D TAB 500 MG-200 UNIT SCH TAB: 500-200 TAB at 13:27

## 2020-06-23 RX ADMIN — SEVELAMER CARBONATE SCH MG: 800 TABLET, FILM COATED ORAL at 13:30

## 2020-06-23 RX ADMIN — INSULIN LISPRO PRN UNITS: 100 INJECTION, SOLUTION INTRAVENOUS; SUBCUTANEOUS at 16:44

## 2020-06-23 RX ADMIN — PROPRANOLOL HYDROCHLORIDE SCH MG: 20 TABLET ORAL at 16:53

## 2020-06-23 RX ADMIN — FERROUS SULFATE TAB 325 MG (65 MG ELEMENTAL FE) SCH MG: 325 (65 FE) TAB at 13:28

## 2020-06-23 RX ADMIN — LACTULOSE SCH GM: 10 SOLUTION ORAL at 09:00

## 2020-06-23 RX ADMIN — Medication SCH DEV: at 21:00

## 2020-06-23 RX ADMIN — Medication SCH DEV: at 16:39

## 2020-06-23 RX ADMIN — CALCIUM CARBONATE-VITAMIN D TAB 500 MG-200 UNIT SCH TAB: 500-200 TAB at 13:32

## 2020-06-23 RX ADMIN — SEVELAMER CARBONATE SCH MG: 800 TABLET, FILM COATED ORAL at 16:55

## 2020-06-23 RX ADMIN — Medication SCH DEV: at 16:30

## 2020-06-23 RX ADMIN — Medication SCH DEV: at 11:30

## 2020-06-23 RX ADMIN — PANTOPRAZOLE SODIUM SCH MLS/HR: 40 INJECTION, POWDER, FOR SOLUTION INTRAVENOUS at 01:40

## 2020-06-23 RX ADMIN — SODIUM CHLORIDE SCH MLS/HR: 0.9 INJECTION, SOLUTION INTRAVENOUS at 15:40

## 2020-06-23 RX ADMIN — LACTULOSE SCH GM: 10 SOLUTION ORAL at 16:53

## 2020-06-23 RX ADMIN — Medication SCH TAB: at 13:31

## 2020-06-23 RX ADMIN — SEVELAMER CARBONATE SCH MG: 800 TABLET, FILM COATED ORAL at 13:25

## 2020-06-23 RX ADMIN — Medication SCH TAB: at 13:29

## 2020-06-23 RX ADMIN — FERROUS SULFATE TAB 325 MG (65 MG ELEMENTAL FE) SCH MG: 325 (65 FE) TAB at 13:32

## 2020-06-23 RX ADMIN — Medication SCH TAB: at 13:26

## 2020-06-23 RX ADMIN — INSULIN GLARGINE SCH UNITS: 100 INJECTION, SOLUTION SUBCUTANEOUS at 21:00

## 2020-06-23 NOTE — NUR
DISCHARGE PLANNING:



THIS IS A 69 Y/O FEMALE PATIENT FROM HOME, WHO WAS BIBA DUE TO WEAKNESS, LETHARGY, BLOOD PER 
RECTUM.  PAST MEDICAL HISTORY INCLUDE ESRD, CHF, DM, ANEMIA, HTN, HYPOTHYROIDISM AND 
HEMORRHOIDS.  INITIAL DIAGNOSIS OF SEVER ANEMIA AND ALOC.  CURRENT LABS INCLUDE WBC 3.1, H/H 
5.1/15.5, NA/K 142/3.4, BUN/CREA 45/2.8, LACTIC ACID 2.3, MAG 1.5, TSH 13.69, ALB 1.1.  3 
UNITS PRBC, 1 CONVALESCENT PLASMA TRANSFUSED.  UPPER GI ENDOSCOPY DONE BY DR. LANCE - 
RECOMMENDED TO CONTINUE SANDOSTATIN DRIP AND TO ADD BETA BLOCKERS ONCE STABLE.  OTHER 
CONSULTS INCLUDE HEMA, NEPHRO AND PULMO IN PLACE.  ON ROCEPHIN AND SANDOSTATIN.  DC PLAN 
BACK TO HOME ONCE STABLE.




-------------------------------------------------------------------------------

Addendum: 06/25/20 at 1030 by Ana Vazquez CM

-------------------------------------------------------------------------------

DC PLANNING:

SEEN BI GI DR LANCE RECOMMENDED TO CONTINUE SANDOSTATIN DRIP FOR TODAY . H/H STABLE AFTER 4 
UNITS OF PRBC DC PLAN TO GO HOME WITH HOME HEALTH FOR PT. FAXED TO [facility] . CM 
TO FOLLOW

-------------------------------------------------------------------------------

Addendum: 06/25/20 at 1125 by Lynne Mcwilliams CM

-------------------------------------------------------------------------------

FAXED PATIENT CLINICALS TO [facility] [phone] FAX # [phone]. ZAKIYA FROM 
[facility] CONTACTED ME BACK AND WILL BE ABLE TO ACCEPT THE PATIENT.

-------------------------------------------------------------------------------

Addendum: 06/26/20 at 1347 by Lynne Mcwilliams CM

-------------------------------------------------------------------------------

SPOKE WITH ZAKIYA PATIENT HAS BEEN ACCEPTED. ZAKIYA STATED SHE SPOKE TO THE PATIENT.

-------------------------------------------------------------------------------

Addendum: 06/26/20 at 1622 by Noy Hagen CM

-------------------------------------------------------------------------------

AS DISCUSSED DURING THE BED HUDDLE, PATIENT WILL HAVE COLONOSCOPY TODAY AND POSSIBLE DC ONCE 
RESULTS ARE OK.

-------------------------------------------------------------------------------

Addendum: 06/29/20 at 1037 by Lynne Mcwilliams CM

-------------------------------------------------------------------------------

FAXED PATIENTS CLINICALS TO [facility] [phone], FAX # [phone]. WILL FOLLOW 
UP. 

-------------------------------------------------------------------------------

Addendum: 06/29/20 at 1137 by Lynne Floreseda CM

-------------------------------------------------------------------------------

[facility] NOT [facility] AT THE TIME SHE WILL CONTACT ME 
BACK, BUT THEY DID VERIFY THAT THEY HAVE RECEIVED MY FAX AND ITS UNDER REVIEW.

-------------------------------------------------------------------------------

Addendum: 06/29/20 at 1150 by Lynne Mcwilliams 

-------------------------------------------------------------------------------

AMANDA CALLED ME BACK AND NOTIFIED ME THAT SHE SPOKE TO THE FAMILY MEMBERS OF PATIENT AND 
EXPLAINED HOW [facility]OSP[id number]. SHE IS WAITING ON THE DAUGHTER CHENG AMARAL TO CALL 
BACK FOR CONSENT OF HOSPICE BECAUSE THE PATIENT LIVES WITH HER DAUGHTER. SO FAR ALL OTHER 
FAMILY MEMBERS ARE IN AGREEMENT. AMANDA'S TELPHONE NUMBER [phone].

## 2020-06-23 NOTE — NUR
PT TAKING LEADS OFF, BP CUFF OFF, ADVISED PT TO KEEP IMPORTANT LINES TO MONITOR PT, PT 
STATES SHE WANTS TO REST AND TO BE LEFT ALONE. UPDATED MD, NO NEW ORDERS. AFTER MULTIPLE 
EFFORTS ABLE TO REAPPLY MONITOR LINES. WILL CONTINUE TO OBSERVE

## 2020-06-23 NOTE — NUR
PATIENT HAS BEEN SCREENED AND CATEGORIZED AS HIGH NUTRITION RISK. PATIENT WILL BE SEEN 
WITHIN 1-2 DAYS OF ADMISSION.



06/23/20-06/24/20



BRYAN LYNN RD

## 2020-06-23 NOTE — NUR
PT SEEN AND ASSESSED. FOUND PT ON ROOM AIR WITH SPO2 OF 97%. PT IS IN NO APPARENT 
RESPIRATORY DISTRESS AT THIS TIME AND NO C/O SOB. WILL CONTINUE TO MONITOR PT.

## 2020-06-23 NOTE — NUR
# [id number] USED; PT INFORMED ABOUT PICC LINE PLACEMENT, AND PLANNED EGD 
FOR TOMRORROW IN AM. PT STATED SHE WOULD LIKE TO REST TONIGHT AND WOULD RECONSIDER IN AM. 
WILL CONTINUE TO MONITOR. 98

## 2020-06-23 NOTE — NUR
PATIENT UP WITH PT TOLERATED WELL

-------------------------------------------------------------------------------

Addendum: 06/23/20 at 1834 by Agency 05 RN RN

-------------------------------------------------------------------------------

Amended: Links added.

## 2020-06-23 NOTE — NUR
BEDSIDE REPORT RECEIVED FROM AM SHIFT RN. SINUS BRADYCARDIA ON MONITOR. PT ALERT, ORIENTED. 
ABLE TO MAKE NEEDS KNOWN. ON ROOM AIR. IV SITE R IJ 18G AND RHAND 20 INTACT, PATENT, GOOD 
BLOOD RETURN. RAC 20 GAUGE, INFUSING SANDOSTATIN 50 MCG/MIN. LEFT AV FISTULA, BRUITS HEARD, 
PALPABLE THRILLS. SKIN INTACT, WARM AND DRY. MOVES ALL EXTREMITIES, GENERALIZED WEAKNESS. 
CALL LIGHT WITHIN REACH. SAFETY PRECAUTIONS IN PLACE. WILL CONTINUE TO MONITOR.

## 2020-06-23 NOTE — NUR
6/23/20 RD INITIAL ASSESSMENT COMPLETED



PLEASE REFER TO NUTRITION ASSESSMENT UNDER CARE ACTIVITY FOR ESTIMATED NUTRITIONAL NEEDS. 



1. CONTINUE FULL LIQUID DIET AS TOLERATED 

2. ADVANCE TO [facility] 60 RENAL MECHANICAL SOFT WHEN MEDICALLY CLEARED

3. GLUCERNA BID WILL BE PROVIDED

4. RD TO FOLLOW-UP 2-3 DAYS, HIGH RISK 



BRYAN LYNN RD

## 2020-06-23 NOTE — NUR
PT TRANSFERRED BACK TO ICU -5 FROM CT SCAN. PT CALM AND APPROPRIATE, PT TOLERATED WELL. WILL 
CONTINUE TO MONITOR.

## 2020-06-24 VITALS — SYSTOLIC BLOOD PRESSURE: 102 MMHG | DIASTOLIC BLOOD PRESSURE: 48 MMHG

## 2020-06-24 VITALS — SYSTOLIC BLOOD PRESSURE: 96 MMHG | DIASTOLIC BLOOD PRESSURE: 46 MMHG

## 2020-06-24 VITALS — DIASTOLIC BLOOD PRESSURE: 49 MMHG | SYSTOLIC BLOOD PRESSURE: 98 MMHG

## 2020-06-24 VITALS — DIASTOLIC BLOOD PRESSURE: 52 MMHG | SYSTOLIC BLOOD PRESSURE: 106 MMHG

## 2020-06-24 VITALS — SYSTOLIC BLOOD PRESSURE: 86 MMHG | DIASTOLIC BLOOD PRESSURE: 50 MMHG

## 2020-06-24 VITALS — SYSTOLIC BLOOD PRESSURE: 91 MMHG | DIASTOLIC BLOOD PRESSURE: 43 MMHG

## 2020-06-24 VITALS — SYSTOLIC BLOOD PRESSURE: 89 MMHG | DIASTOLIC BLOOD PRESSURE: 44 MMHG

## 2020-06-24 VITALS — DIASTOLIC BLOOD PRESSURE: 32 MMHG | SYSTOLIC BLOOD PRESSURE: 90 MMHG

## 2020-06-24 VITALS — SYSTOLIC BLOOD PRESSURE: 116 MMHG | DIASTOLIC BLOOD PRESSURE: 56 MMHG

## 2020-06-24 VITALS — SYSTOLIC BLOOD PRESSURE: 94 MMHG | DIASTOLIC BLOOD PRESSURE: 43 MMHG

## 2020-06-24 VITALS — DIASTOLIC BLOOD PRESSURE: 53 MMHG | SYSTOLIC BLOOD PRESSURE: 102 MMHG

## 2020-06-24 VITALS — DIASTOLIC BLOOD PRESSURE: 43 MMHG | SYSTOLIC BLOOD PRESSURE: 91 MMHG

## 2020-06-24 VITALS — DIASTOLIC BLOOD PRESSURE: 54 MMHG | SYSTOLIC BLOOD PRESSURE: 112 MMHG

## 2020-06-24 VITALS — SYSTOLIC BLOOD PRESSURE: 99 MMHG | DIASTOLIC BLOOD PRESSURE: 39 MMHG

## 2020-06-24 VITALS — SYSTOLIC BLOOD PRESSURE: 99 MMHG | DIASTOLIC BLOOD PRESSURE: 48 MMHG

## 2020-06-24 VITALS — SYSTOLIC BLOOD PRESSURE: 89 MMHG | DIASTOLIC BLOOD PRESSURE: 40 MMHG

## 2020-06-24 VITALS — DIASTOLIC BLOOD PRESSURE: 45 MMHG | SYSTOLIC BLOOD PRESSURE: 107 MMHG

## 2020-06-24 VITALS — DIASTOLIC BLOOD PRESSURE: 53 MMHG | SYSTOLIC BLOOD PRESSURE: 123 MMHG

## 2020-06-24 VITALS — SYSTOLIC BLOOD PRESSURE: 99 MMHG | DIASTOLIC BLOOD PRESSURE: 47 MMHG

## 2020-06-24 VITALS — SYSTOLIC BLOOD PRESSURE: 118 MMHG | DIASTOLIC BLOOD PRESSURE: 77 MMHG

## 2020-06-24 VITALS — DIASTOLIC BLOOD PRESSURE: 54 MMHG | SYSTOLIC BLOOD PRESSURE: 121 MMHG

## 2020-06-24 VITALS — DIASTOLIC BLOOD PRESSURE: 44 MMHG | SYSTOLIC BLOOD PRESSURE: 82 MMHG

## 2020-06-24 VITALS — SYSTOLIC BLOOD PRESSURE: 93 MMHG | DIASTOLIC BLOOD PRESSURE: 43 MMHG

## 2020-06-24 VITALS — SYSTOLIC BLOOD PRESSURE: 82 MMHG | DIASTOLIC BLOOD PRESSURE: 39 MMHG

## 2020-06-24 VITALS — DIASTOLIC BLOOD PRESSURE: 43 MMHG | SYSTOLIC BLOOD PRESSURE: 97 MMHG

## 2020-06-24 VITALS — SYSTOLIC BLOOD PRESSURE: 94 MMHG | DIASTOLIC BLOOD PRESSURE: 41 MMHG

## 2020-06-24 VITALS — SYSTOLIC BLOOD PRESSURE: 95 MMHG | DIASTOLIC BLOOD PRESSURE: 48 MMHG

## 2020-06-24 VITALS — SYSTOLIC BLOOD PRESSURE: 93 MMHG | DIASTOLIC BLOOD PRESSURE: 36 MMHG

## 2020-06-24 VITALS — DIASTOLIC BLOOD PRESSURE: 51 MMHG | SYSTOLIC BLOOD PRESSURE: 108 MMHG

## 2020-06-24 VITALS — DIASTOLIC BLOOD PRESSURE: 70 MMHG | SYSTOLIC BLOOD PRESSURE: 142 MMHG

## 2020-06-24 VITALS — DIASTOLIC BLOOD PRESSURE: 48 MMHG | SYSTOLIC BLOOD PRESSURE: 100 MMHG

## 2020-06-24 VITALS — SYSTOLIC BLOOD PRESSURE: 105 MMHG | DIASTOLIC BLOOD PRESSURE: 52 MMHG

## 2020-06-24 VITALS — SYSTOLIC BLOOD PRESSURE: 97 MMHG | DIASTOLIC BLOOD PRESSURE: 44 MMHG

## 2020-06-24 VITALS — DIASTOLIC BLOOD PRESSURE: 53 MMHG | SYSTOLIC BLOOD PRESSURE: 115 MMHG

## 2020-06-24 VITALS — DIASTOLIC BLOOD PRESSURE: 53 MMHG | SYSTOLIC BLOOD PRESSURE: 126 MMHG

## 2020-06-24 LAB
ANION GAP SERPL CALCULATED.3IONS-SCNC: 9.4 MMOL/L (ref 8–16)
BASOPHILS # BLD AUTO: 0.1 K/UL (ref 0–0.22)
BASOPHILS # BLD AUTO: 0.1 K/UL (ref 0–0.22)
BASOPHILS NFR BLD AUTO: 1.1 % (ref 0–2)
BASOPHILS NFR BLD AUTO: 1.5 % (ref 0–2)
BUN SERPL-MCNC: 43 MG/DL (ref 7–18)
CHLORIDE SERPL-SCNC: 99 MMOL/L (ref 98–107)
CO2 SERPL-SCNC: 31.7 MMOL/L (ref 21–32)
CREAT SERPL-MCNC: 4.2 MG/DL (ref 0.6–1.3)
EOSINOPHIL # BLD AUTO: 0.3 K/UL (ref 0–0.4)
EOSINOPHIL # BLD AUTO: 0.3 K/UL (ref 0–0.4)
EOSINOPHIL NFR BLD AUTO: 5.5 % (ref 0–4)
EOSINOPHIL NFR BLD AUTO: 7.4 % (ref 0–4)
ERYTHROCYTE [DISTWIDTH] IN BLOOD BY AUTOMATED COUNT: 16.6 % (ref 11.6–13.7)
ERYTHROCYTE [DISTWIDTH] IN BLOOD BY AUTOMATED COUNT: 17.1 % (ref 11.6–13.7)
GFR SERPL CREATININE-BSD FRML MDRD: 13 ML/MIN (ref 90–?)
GLUCOSE SERPL-MCNC: 95 MG/DL (ref 74–106)
HCT VFR BLD AUTO: 20.5 % (ref 36–48)
HCT VFR BLD AUTO: 27.8 % (ref 36–48)
HGB BLD-MCNC: 6.9 G/DL (ref 12–16)
HGB BLD-MCNC: 9.3 G/DL (ref 12–16)
LYMPHOCYTES # BLD AUTO: 0.4 K/UL (ref 2.5–16.5)
LYMPHOCYTES # BLD AUTO: 0.4 K/UL (ref 2.5–16.5)
LYMPHOCYTES NFR BLD AUTO: 11 % (ref 20.5–51.1)
LYMPHOCYTES NFR BLD AUTO: 7.4 % (ref 20.5–51.1)
MAGNESIUM SERPL-MCNC: 2 MG/DL (ref 1.8–2.4)
MCH RBC QN AUTO: 30 PG (ref 27–31)
MCH RBC QN AUTO: 30 PG (ref 27–31)
MCHC RBC AUTO-ENTMCNC: 33 G/DL (ref 33–37)
MCHC RBC AUTO-ENTMCNC: 34 G/DL (ref 33–37)
MCV RBC AUTO: 89.2 FL (ref 80–94)
MCV RBC AUTO: 90.7 FL (ref 80–94)
MONOCYTES # BLD AUTO: 0.4 K/UL (ref 0.8–1)
MONOCYTES # BLD AUTO: 0.5 K/UL (ref 0.8–1)
MONOCYTES NFR BLD AUTO: 11.5 % (ref 1.7–9.3)
MONOCYTES NFR BLD AUTO: 9.6 % (ref 1.7–9.3)
NEUTROPHILS # BLD AUTO: 2.4 K/UL (ref 1.8–7.7)
NEUTROPHILS # BLD AUTO: 3.9 K/UL (ref 1.8–7.7)
NEUTROPHILS NFR BLD AUTO: 68.6 % (ref 42.2–75.2)
NEUTROPHILS NFR BLD AUTO: 76.4 % (ref 42.2–75.2)
PHOSPHATE SERPL-MCNC: 6 MG/DL (ref 2.5–4.9)
PLATELET # BLD AUTO: 52 K/UL (ref 140–450)
PLATELET # BLD AUTO: 60 K/UL (ref 140–450)
POTASSIUM SERPL-SCNC: 4.1 MMOL/L (ref 3.5–5.1)
RBC # BLD AUTO: 2.3 MIL/UL (ref 4.2–5.4)
RBC # BLD AUTO: 3.06 MIL/UL (ref 4.2–5.4)
SODIUM SERPL-SCNC: 136 MMOL/L (ref 136–145)
WBC # BLD AUTO: 3.4 K/UL (ref 4.8–10.8)
WBC # BLD AUTO: 5.2 K/UL (ref 4.8–10.8)

## 2020-06-24 PROCEDURE — 30233N1 TRANSFUSION OF NONAUTOLOGOUS RED BLOOD CELLS INTO PERIPHERAL VEIN, PERCUTANEOUS APPROACH: ICD-10-PCS

## 2020-06-24 RX ADMIN — PROPRANOLOL HYDROCHLORIDE SCH MG: 20 TABLET ORAL at 17:00

## 2020-06-24 RX ADMIN — CALCIUM CARBONATE-VITAMIN D TAB 500 MG-200 UNIT SCH TAB: 500-200 TAB at 08:20

## 2020-06-24 RX ADMIN — CALCIUM CARBONATE-VITAMIN D TAB 500 MG-200 UNIT SCH TAB: 500-200 TAB at 20:16

## 2020-06-24 RX ADMIN — RIFAXIMIN SCH MG: 550 TABLET ORAL at 20:16

## 2020-06-24 RX ADMIN — INSULIN LISPRO PRN UNITS: 100 INJECTION, SOLUTION INTRAVENOUS; SUBCUTANEOUS at 13:03

## 2020-06-24 RX ADMIN — SEVELAMER CARBONATE SCH MG: 800 TABLET, FILM COATED ORAL at 08:19

## 2020-06-24 RX ADMIN — Medication SCH DEV: at 15:51

## 2020-06-24 RX ADMIN — LANSOPRAZOLE SCH MG: 30 CAPSULE, DELAYED RELEASE ORAL at 06:28

## 2020-06-24 RX ADMIN — SODIUM CHLORIDE SCH MLS/HR: 0.9 INJECTION, SOLUTION INTRAVENOUS at 15:40

## 2020-06-24 RX ADMIN — PROPRANOLOL HYDROCHLORIDE SCH MG: 20 TABLET ORAL at 13:00

## 2020-06-24 RX ADMIN — Medication SCH DEV: at 17:45

## 2020-06-24 RX ADMIN — INSULIN GLARGINE SCH UNITS: 100 INJECTION, SOLUTION SUBCUTANEOUS at 20:16

## 2020-06-24 RX ADMIN — SEVELAMER CARBONATE SCH MG: 800 TABLET, FILM COATED ORAL at 12:00

## 2020-06-24 RX ADMIN — FERROUS SULFATE TAB 325 MG (65 MG ELEMENTAL FE) SCH MG: 325 (65 FE) TAB at 08:19

## 2020-06-24 RX ADMIN — Medication SCH DEV: at 11:30

## 2020-06-24 RX ADMIN — FERROUS SULFATE TAB 325 MG (65 MG ELEMENTAL FE) SCH MG: 325 (65 FE) TAB at 20:15

## 2020-06-24 RX ADMIN — SODIUM CHLORIDE SCH MLS/HR: 0.9 INJECTION, SOLUTION INTRAVENOUS at 16:04

## 2020-06-24 RX ADMIN — Medication SCH TAB: at 08:21

## 2020-06-24 RX ADMIN — LACTULOSE SCH GM: 10 SOLUTION ORAL at 17:45

## 2020-06-24 RX ADMIN — INSULIN LISPRO PRN UNITS: 100 INJECTION, SOLUTION INTRAVENOUS; SUBCUTANEOUS at 20:17

## 2020-06-24 RX ADMIN — GABAPENTIN SCH MG: 100 CAPSULE ORAL at 20:16

## 2020-06-24 RX ADMIN — SEVELAMER CARBONATE SCH MG: 800 TABLET, FILM COATED ORAL at 17:45

## 2020-06-24 RX ADMIN — PANTOPRAZOLE SODIUM SCH MG: 40 INJECTION, POWDER, FOR SOLUTION INTRAVENOUS at 09:00

## 2020-06-24 RX ADMIN — LEVOTHYROXINE SODIUM SCH MG: 75 TABLET ORAL at 06:28

## 2020-06-24 RX ADMIN — PROPRANOLOL HYDROCHLORIDE SCH MG: 20 TABLET ORAL at 08:20

## 2020-06-24 RX ADMIN — LACTULOSE SCH GM: 10 SOLUTION ORAL at 13:09

## 2020-06-24 RX ADMIN — Medication SCH DEV: at 08:21

## 2020-06-24 RX ADMIN — LACTULOSE SCH GM: 10 SOLUTION ORAL at 08:19

## 2020-06-24 NOTE — NUR
SANDOSTATIN IV DRIP INFUSING. SR ON MONITOR. BP WNL. RESPIRATIONS EVEN AND UNLABORED. CHEST 
RISE IS SYMMETRICAL. WILL CONTINUE TO MONITOR.

## 2020-06-24 NOTE — NUR
USED  #[id number] PATIENT REQUESTING PURSE SO PATIENT CAN TAKE OWN 
MEDICATIONS. EXPLAINED NURSE IS GIVING HER MEDICATIONS PER MD ORDERS. PATIENT STATES 
UNDERSTANDS.

## 2020-06-24 NOTE — NUR
DIALYSIS COMPLETED, X1 LITER OFF DURING TREATMENT. DISCONTINUED LEVOPHED AT 1500 PER 
DIALYSIS NURSE. DIALYSIS NURSE KEPT SBP HIGHER LEVEL TO COMPLETE TX AND TAKE FLUIDS OFF.

## 2020-06-24 NOTE — NUR
PT TRANSFERRED TO TELEMETRY FLOOR. REPORT GIVEN TO NATE LOCKHART AT BEDSIDE. PT IN STABLE 
CONDITION.

## 2020-06-24 NOTE — NUR
RECEIVED REPORT FROM AM SHIFT. PT SEEN AND ASSESSED. FOUND PT ON ROOM AIR WITH SPO2 %. 
NOTICED ADEQUATE BILATERAL CHEST RISE AND AND FALL. PT IS IN NO APPARENT RESPIRATORY 
DISTRESS AT THIS TIME AND NO C/O SOB. WILL CONTINUE TO MONITOR PT.

## 2020-06-24 NOTE — NUR
NOTE:



Patient's Orientation 

Person

Situation

Place

Time

Information Provided By 

CHENG AMARAL - DAUGHTER

, Realtionship and Phone Number 

CHENG AMARAL

[phone]

DAUGHTER

Healthcare Power of  

No

Does Patient Have a POLST 

No

Identifying Problems 

No Social Work Triggers

Is A Social Work Consult Needed 

No

Mandate Report Filed 

No

Explanation Of Identifying Problems 

PATIENT IS A 70-YEAR-OLD FEMALE ADMITTED FOR SEVERE ANEMIA AND ALOC. 

PATIENT HAS PMHX OF ESRD, CHF, DM, HTN, HYPOTHYROIDISM, AND HEMORRHOIDS.

Admitted From 

Home

Pre-Admission Level Of Functioning Status 

Independent/Ambulatory

Prior Resources/Services Used In Last 12 Months 

No Prior Resources Used

Prior DME 

[facility]

Dialysis 

Hemodialysis

Name And Phone Number of Dialysis Facility 

[facility] - [phone]

[facility]RD Outpatient Days 

[facility]

Mode Of Transportation to Dialysis 

[facility]

Living Situation 

Apartment

Lives With Family

Home Support 

No Caregiver Issues

Financial Issues 

No Known Financial Issue

Factors/Needs 

No D/C Needs Identified

Pt/Rep Participated In Discharge Plan 

Yes

Discharge Plan Comments 

TENTATIVE DISCHARGE PLAN IS FOR PATIENT TO RETURN HOME.

DC Plan Status 

Initiated

## 2020-06-24 NOTE — NUR
. 2 UNITS OF HUMALOG GIVEN. 10 UNITS OF LANTUS GIVEN. OSCAL, XIFAXAN, NEURONTIN, AND 
FERROUS SULFATE GIVEN PO. PT TOLERATED WELL.

## 2020-06-24 NOTE — NUR
REPORT RECEIVED FROM AM NURSE AT BEDSIDE. PT IN STABLE CONDITION. AAOX4. NO COMPLAINTS OF 
PAIN. NO SOB ON RA O2 SATURATION@99%. AFEBRILE@99.0. PT IS French SPEAKING. PT IS ON 
BEDREST. PT IS ANURIC. PT ON CCHO 60 DIET. IV SITE R AC 20G SL DOES NOT FLUSH WELL. R HAND 
20G RUNNING SANDOSTATIN@10ML/HR PATENT AND INTACT. SKIN WARM, DRY, AND INTACT WITH NO OPEN 
WOUNDS. BED LOCKED IN LOW POSITION. CALL BELL WITHIN REACH. SAFETY PRECAUTION IN PLACE. ALL 
NEEDS MET AT THIS TIME.

## 2020-06-24 NOTE — NUR
PT HAS EYES CLOSED, RESPIRATIONS EVEN AND UNLABORED. CHEST RISE IS SYMMETRICAL. WILL 
CONTINUE TO MONITOR.

## 2020-06-24 NOTE — NUR
RECEIVED PT FROM ICU NURSE, ANDREA. PT CAME IN WHEEL CHAIR. PT BREATHING EVEN AND UNLABORED 
WITH ROOM AIR. IV SITE ON LAC 20G, SL, RH, 20G. PATENT, INTACT, AND ASYMPTOMATIC. SKIN 
INTACT, WARM AND DRY TO TOUCH. AV SHUNT IN BRITTNEY. ORIENTED ROOM TO PT. BED IN LOW POSITION, 
CALL LIGHT WITHIN REACH.

## 2020-06-25 VITALS — DIASTOLIC BLOOD PRESSURE: 57 MMHG | SYSTOLIC BLOOD PRESSURE: 116 MMHG

## 2020-06-25 VITALS — SYSTOLIC BLOOD PRESSURE: 135 MMHG | DIASTOLIC BLOOD PRESSURE: 79 MMHG

## 2020-06-25 VITALS — SYSTOLIC BLOOD PRESSURE: 103 MMHG | DIASTOLIC BLOOD PRESSURE: 43 MMHG

## 2020-06-25 VITALS — SYSTOLIC BLOOD PRESSURE: 107 MMHG | DIASTOLIC BLOOD PRESSURE: 46 MMHG

## 2020-06-25 VITALS — SYSTOLIC BLOOD PRESSURE: 121 MMHG | DIASTOLIC BLOOD PRESSURE: 54 MMHG

## 2020-06-25 VITALS — SYSTOLIC BLOOD PRESSURE: 117 MMHG | DIASTOLIC BLOOD PRESSURE: 43 MMHG

## 2020-06-25 LAB
ANION GAP SERPL CALCULATED.3IONS-SCNC: 10 MMOL/L (ref 8–16)
BASOPHILS # BLD AUTO: 0 K/UL (ref 0–0.22)
BASOPHILS # BLD AUTO: 0 K/UL (ref 0–0.22)
BASOPHILS NFR BLD AUTO: 0.8 % (ref 0–2)
BASOPHILS NFR BLD AUTO: 0.9 % (ref 0–2)
BUN SERPL-MCNC: 42 MG/DL (ref 7–18)
CHLORIDE SERPL-SCNC: 98 MMOL/L (ref 98–107)
CO2 SERPL-SCNC: 27.6 MMOL/L (ref 21–32)
CREAT SERPL-MCNC: 3.8 MG/DL (ref 0.6–1.3)
EOSINOPHIL # BLD AUTO: 0.2 K/UL (ref 0–0.4)
EOSINOPHIL # BLD AUTO: 0.2 K/UL (ref 0–0.4)
EOSINOPHIL NFR BLD AUTO: 4.8 % (ref 0–4)
EOSINOPHIL NFR BLD AUTO: 5.7 % (ref 0–4)
ERYTHROCYTE [DISTWIDTH] IN BLOOD BY AUTOMATED COUNT: 16.8 % (ref 11.6–13.7)
ERYTHROCYTE [DISTWIDTH] IN BLOOD BY AUTOMATED COUNT: 16.9 % (ref 11.6–13.7)
GFR SERPL CREATININE-BSD FRML MDRD: 15 ML/MIN (ref 90–?)
GLUCOSE SERPL-MCNC: 249 MG/DL (ref 74–106)
HCT VFR BLD AUTO: 21.1 % (ref 36–48)
HCT VFR BLD AUTO: 24.9 % (ref 36–48)
HGB BLD-MCNC: 7.1 G/DL (ref 12–16)
HGB BLD-MCNC: 8.4 G/DL (ref 12–16)
LYMPHOCYTES # BLD AUTO: 0.2 K/UL (ref 2.5–16.5)
LYMPHOCYTES # BLD AUTO: 0.3 K/UL (ref 2.5–16.5)
LYMPHOCYTES NFR BLD AUTO: 4.4 % (ref 20.5–51.1)
LYMPHOCYTES NFR BLD AUTO: 5.9 % (ref 20.5–51.1)
MAGNESIUM SERPL-MCNC: 1.8 MG/DL (ref 1.8–2.4)
MCH RBC QN AUTO: 30 PG (ref 27–31)
MCH RBC QN AUTO: 30 PG (ref 27–31)
MCHC RBC AUTO-ENTMCNC: 33 G/DL (ref 33–37)
MCHC RBC AUTO-ENTMCNC: 34 G/DL (ref 33–37)
MCV RBC AUTO: 90.4 FL (ref 80–94)
MCV RBC AUTO: 91.1 FL (ref 80–94)
MONOCYTES # BLD AUTO: 0.4 K/UL (ref 0.8–1)
MONOCYTES # BLD AUTO: 0.5 K/UL (ref 0.8–1)
MONOCYTES NFR BLD AUTO: 10 % (ref 1.7–9.3)
MONOCYTES NFR BLD AUTO: 11.9 % (ref 1.7–9.3)
NEUTROPHILS # BLD AUTO: 3.2 K/UL (ref 1.8–7.7)
NEUTROPHILS # BLD AUTO: 3.5 K/UL (ref 1.8–7.7)
NEUTROPHILS NFR BLD AUTO: 77.2 % (ref 42.2–75.2)
NEUTROPHILS NFR BLD AUTO: 78.4 % (ref 42.2–75.2)
PHOSPHATE SERPL-MCNC: 4.2 MG/DL (ref 2.5–4.9)
PLATELET # BLD AUTO: 45 K/UL (ref 140–450)
PLATELET # BLD AUTO: 47 K/UL (ref 140–450)
POTASSIUM SERPL-SCNC: 3.6 MMOL/L (ref 3.5–5.1)
RBC # BLD AUTO: 2.32 MIL/UL (ref 4.2–5.4)
RBC # BLD AUTO: 2.76 MIL/UL (ref 4.2–5.4)
SODIUM SERPL-SCNC: 132 MMOL/L (ref 136–145)
WBC # BLD AUTO: 4.1 K/UL (ref 4.8–10.8)
WBC # BLD AUTO: 4.5 K/UL (ref 4.8–10.8)

## 2020-06-25 RX ADMIN — INSULIN LISPRO PRN UNITS: 100 INJECTION, SOLUTION INTRAVENOUS; SUBCUTANEOUS at 05:59

## 2020-06-25 RX ADMIN — LANSOPRAZOLE SCH MG: 30 CAPSULE, DELAYED RELEASE ORAL at 05:42

## 2020-06-25 RX ADMIN — PROPRANOLOL HYDROCHLORIDE SCH MG: 20 TABLET ORAL at 08:51

## 2020-06-25 RX ADMIN — PANTOPRAZOLE SODIUM SCH MG: 40 INJECTION, POWDER, FOR SOLUTION INTRAVENOUS at 08:50

## 2020-06-25 RX ADMIN — SENNOSIDES A AND B SCH MG: 8.6 TABLET, FILM COATED ORAL at 13:00

## 2020-06-25 RX ADMIN — IPRATROPIUM BROMIDE AND ALBUTEROL SULFATE SCH ML: .5; 3 SOLUTION RESPIRATORY (INHALATION) at 19:00

## 2020-06-25 RX ADMIN — LACTULOSE SCH GM: 10 SOLUTION ORAL at 08:51

## 2020-06-25 RX ADMIN — Medication SCH DEV: at 11:07

## 2020-06-25 RX ADMIN — SEVELAMER CARBONATE SCH MG: 800 TABLET, FILM COATED ORAL at 17:19

## 2020-06-25 RX ADMIN — Medication SCH DEV: at 17:12

## 2020-06-25 RX ADMIN — SODIUM CHLORIDE SCH MLS/HR: 0.9 INJECTION, SOLUTION INTRAVENOUS at 23:54

## 2020-06-25 RX ADMIN — SEVELAMER CARBONATE SCH MG: 800 TABLET, FILM COATED ORAL at 08:50

## 2020-06-25 RX ADMIN — CALCIUM CARBONATE-VITAMIN D TAB 500 MG-200 UNIT SCH TAB: 500-200 TAB at 21:00

## 2020-06-25 RX ADMIN — Medication SCH DEV: at 05:57

## 2020-06-25 RX ADMIN — FERROUS SULFATE TAB 325 MG (65 MG ELEMENTAL FE) SCH MG: 325 (65 FE) TAB at 08:51

## 2020-06-25 RX ADMIN — Medication SCH TAB: at 08:51

## 2020-06-25 RX ADMIN — POLYETHYLENE GLYCOL 3350 SCH GM: 17 POWDER, FOR SOLUTION ORAL at 21:59

## 2020-06-25 RX ADMIN — INSULIN LISPRO PRN UNITS: 100 INJECTION, SOLUTION INTRAVENOUS; SUBCUTANEOUS at 22:15

## 2020-06-25 RX ADMIN — SEVELAMER CARBONATE SCH MG: 800 TABLET, FILM COATED ORAL at 11:08

## 2020-06-25 RX ADMIN — SODIUM CHLORIDE SCH MLS/HR: 0.9 INJECTION, SOLUTION INTRAVENOUS at 12:09

## 2020-06-25 RX ADMIN — ACETAMINOPHEN PRN MG: 325 TABLET ORAL at 23:15

## 2020-06-25 RX ADMIN — RIFAXIMIN SCH MG: 550 TABLET ORAL at 08:51

## 2020-06-25 RX ADMIN — INSULIN GLARGINE SCH UNITS: 100 INJECTION, SOLUTION SUBCUTANEOUS at 22:16

## 2020-06-25 RX ADMIN — FERROUS SULFATE TAB 325 MG (65 MG ELEMENTAL FE) SCH MG: 325 (65 FE) TAB at 21:58

## 2020-06-25 RX ADMIN — PROPRANOLOL HYDROCHLORIDE SCH MG: 20 TABLET ORAL at 17:00

## 2020-06-25 RX ADMIN — INSULIN LISPRO PRN UNITS: 100 INJECTION, SOLUTION INTRAVENOUS; SUBCUTANEOUS at 11:09

## 2020-06-25 RX ADMIN — PROPRANOLOL HYDROCHLORIDE SCH MG: 20 TABLET ORAL at 13:00

## 2020-06-25 RX ADMIN — SENNOSIDES A AND B SCH MG: 8.6 TABLET, FILM COATED ORAL at 17:19

## 2020-06-25 RX ADMIN — RIFAXIMIN SCH MG: 550 TABLET ORAL at 22:01

## 2020-06-25 RX ADMIN — IPRATROPIUM BROMIDE AND ALBUTEROL SULFATE SCH ML: .5; 3 SOLUTION RESPIRATORY (INHALATION) at 13:50

## 2020-06-25 RX ADMIN — ACETAMINOPHEN PRN MG: 325 TABLET ORAL at 09:40

## 2020-06-25 RX ADMIN — INSULIN LISPRO PRN UNITS: 100 INJECTION, SOLUTION INTRAVENOUS; SUBCUTANEOUS at 17:21

## 2020-06-25 RX ADMIN — GABAPENTIN SCH MG: 100 CAPSULE ORAL at 21:59

## 2020-06-25 RX ADMIN — CALCIUM CARBONATE-VITAMIN D TAB 500 MG-200 UNIT SCH TAB: 500-200 TAB at 08:51

## 2020-06-25 RX ADMIN — Medication SCH DEV: at 21:00

## 2020-06-25 RX ADMIN — LACTULOSE SCH GM: 10 SOLUTION ORAL at 21:58

## 2020-06-25 RX ADMIN — LEVOTHYROXINE SODIUM SCH MG: 75 TABLET ORAL at 05:42

## 2020-06-25 NOTE — NUR
DUE MEDICATION ADMINISTERD, PT TOLERATED WELL, NO DISTRESS NOTED, CALL LIGHT WITHIN REACH, 
WILL CONTINUE TO MONITOR.

## 2020-06-25 NOTE — NUR
PACKED RED BLOOD CELLS READY AND PICKED UP PT RAC IV WAS REMOVED DUE TO OCCLUSION. RIGHT 
HAND 20 GUAGE IV WAS NOTED LEAKING AND REMOVED. NEW IV SITE TO PROVIDED TRANSFUSION WAS 
UNABLE TO BE ESTABLISHED. CONTACTED ER NURSE TO ASSIST. ER NURSE TRIED X5 TIMES. CHARGE 
NURSE MADE AWARE AND ALSO TRIED X3. PT DECLINED TO BE POKED FOR ANOTHER TRY AT IV SITE. RED 
BLOOD CELLS WERE RETURNED.

## 2020-06-25 NOTE — NUR
PT HAS AN EPISODE OF BLOOD CLOTS AND FRESH BLOOD COMING FROM THE RECTUM, NOTIFIED DR. FABI DR. STATED UNDERSTANDING AND WILL ORDER CBC

## 2020-06-25 NOTE — NUR
PT STATED HAVING SOB SATURATION 92%, NOTIFIED DR. JOIE DR. ORDERED TO GIVE PT 2LPM O2 VIA 
NC, DR WILL ORDER BREATHING TREATMENT.

## 2020-06-25 NOTE — NUR
RECEIVED BEDSIDE REPORT FROM NIGHT SHIFT NURSE, PT STABLE, NO DISTRESS NOTED, IV TO R WRIST 
22G PATENT INTACT, INFUSING WELL, IV TO R AC 20G, OCCLUDED, WILL REMOVE, PT ON ROOM AIR, NO 
SOB NOTED, INITIAL ASSESSMENT DONE, ALL SAFETY PRECAUTION MET, CALL LIGHT WITHIN REACH, WILL 
CONTINUE TO MONITOR.

## 2020-06-25 NOTE — NUR
PT DID BM, BM WAS BLOODY DIARRHEA, DR. ALSTON NOTIFIED.  AWARE. PT WENT BACK TO BED, NO 
DISTRESS NOTED, WILL CONTINUE TO MONITOR.

## 2020-06-25 NOTE — NUR
PT RESTING, NO DISTRESS NOTED, DUE MEDICATION ADMINISTERED, PT TOLERATED WELL, WILL CONTINUE 
TO MONITOR.

## 2020-06-25 NOTE — NUR
PT SIGNED CONSENT FOR COLONOSCOPY TRANSLATED BY  GIGI [id number], THROUGH THE 
 PHONE. PT RESTING, WILL CONTINUE TO MONITOR.

## 2020-06-25 NOTE — NUR
RECEIVED REPORT FORM PERCY LOCKHART DAYSHIFT NURSE AT BEDSIDE OR CONTINUITY OF CARE, PT IN 
STABLE CONDITION.

## 2020-06-25 NOTE — NUR
PT IN BED AOX3 Albanian SPEAKING WITH IV SITES ON RIGHT AC WHICIH IS OCCLUDED AND RIGHT HAND 
WHICH WORKS BUT IS LEAKING. ORDERED IV FLUIDS RUNNING AT 20MLS/HR, PT IS CONTINENT WITH 
EPISODES OF INCONTINENCE. V/S AS FOLLOWS: T 98.9 P 59 R 18 B/P 107/46 02 97% WITH 2 LITERS 
VIA N/C. ALL FALLS PRECAUTIONS IN PLACE,

## 2020-06-25 NOTE — NUR
PT FINGERSTICK  PT GIVEN 4 UNITS OF HUMALOG  COVERAGE AS PER S/S. PT ALSO GIVEN ALL 
DUE PO MEDS EDUCATION REGARDING MEDICATION AND PURPOSES PROVIDED AT BEDSIDE, INCLUDING SIDE 
EFFECTS. ALL REQUESTED NEEDS ATTENDED BY STAFF. AND ALL FALLS PROTOCOL IN PLACE.

## 2020-06-26 VITALS — SYSTOLIC BLOOD PRESSURE: 121 MMHG | DIASTOLIC BLOOD PRESSURE: 49 MMHG

## 2020-06-26 VITALS — DIASTOLIC BLOOD PRESSURE: 52 MMHG | SYSTOLIC BLOOD PRESSURE: 127 MMHG

## 2020-06-26 VITALS — DIASTOLIC BLOOD PRESSURE: 51 MMHG | SYSTOLIC BLOOD PRESSURE: 93 MMHG

## 2020-06-26 VITALS — SYSTOLIC BLOOD PRESSURE: 120 MMHG | DIASTOLIC BLOOD PRESSURE: 49 MMHG

## 2020-06-26 VITALS — SYSTOLIC BLOOD PRESSURE: 141 MMHG | DIASTOLIC BLOOD PRESSURE: 59 MMHG

## 2020-06-26 VITALS — DIASTOLIC BLOOD PRESSURE: 48 MMHG | SYSTOLIC BLOOD PRESSURE: 111 MMHG

## 2020-06-26 LAB
ANION GAP SERPL CALCULATED.3IONS-SCNC: 15.5 MMOL/L (ref 8–16)
BASOPHILS # BLD AUTO: 0 K/UL (ref 0–0.22)
BASOPHILS # BLD AUTO: 0 K/UL (ref 0–0.22)
BASOPHILS NFR BLD AUTO: 0.8 % (ref 0–2)
BASOPHILS NFR BLD AUTO: 1 % (ref 0–2)
BUN SERPL-MCNC: 55 MG/DL (ref 7–18)
CHLORIDE SERPL-SCNC: 95 MMOL/L (ref 98–107)
CO2 SERPL-SCNC: 26.3 MMOL/L (ref 21–32)
CREAT SERPL-MCNC: 4.8 MG/DL (ref 0.6–1.3)
EOSINOPHIL # BLD AUTO: 0.3 K/UL (ref 0–0.4)
EOSINOPHIL # BLD AUTO: 0.4 K/UL (ref 0–0.4)
EOSINOPHIL NFR BLD AUTO: 8.1 % (ref 0–4)
EOSINOPHIL NFR BLD AUTO: 8.2 % (ref 0–4)
ERYTHROCYTE [DISTWIDTH] IN BLOOD BY AUTOMATED COUNT: 16.2 % (ref 11.6–13.7)
ERYTHROCYTE [DISTWIDTH] IN BLOOD BY AUTOMATED COUNT: 17.2 % (ref 11.6–13.7)
GFR SERPL CREATININE-BSD FRML MDRD: 12 ML/MIN (ref 90–?)
GLUCOSE SERPL-MCNC: 87 MG/DL (ref 74–106)
HCT VFR BLD AUTO: 25.1 % (ref 36–48)
HCT VFR BLD AUTO: 25.8 % (ref 36–48)
HGB BLD-MCNC: 8.2 G/DL (ref 12–16)
HGB BLD-MCNC: 8.6 G/DL (ref 12–16)
LYMPHOCYTES # BLD AUTO: 0.2 K/UL (ref 2.5–16.5)
LYMPHOCYTES # BLD AUTO: 0.4 K/UL (ref 2.5–16.5)
LYMPHOCYTES NFR BLD AUTO: 5.3 % (ref 20.5–51.1)
LYMPHOCYTES NFR BLD AUTO: 8.7 % (ref 20.5–51.1)
MAGNESIUM SERPL-MCNC: 2 MG/DL (ref 1.8–2.4)
MCH RBC QN AUTO: 30 PG (ref 27–31)
MCH RBC QN AUTO: 30 PG (ref 27–31)
MCHC RBC AUTO-ENTMCNC: 33 G/DL (ref 33–37)
MCHC RBC AUTO-ENTMCNC: 33 G/DL (ref 33–37)
MCV RBC AUTO: 91.2 FL (ref 80–94)
MCV RBC AUTO: 91.9 FL (ref 80–94)
MONOCYTES # BLD AUTO: 0.4 K/UL (ref 0.8–1)
MONOCYTES # BLD AUTO: 0.4 K/UL (ref 0.8–1)
MONOCYTES NFR BLD AUTO: 8.4 % (ref 1.7–9.3)
MONOCYTES NFR BLD AUTO: 9.6 % (ref 1.7–9.3)
NEUTROPHILS # BLD AUTO: 3.1 K/UL (ref 1.8–7.7)
NEUTROPHILS # BLD AUTO: 3.3 K/UL (ref 1.8–7.7)
NEUTROPHILS NFR BLD AUTO: 73.8 % (ref 42.2–75.2)
NEUTROPHILS NFR BLD AUTO: 76.1 % (ref 42.2–75.2)
PHOSPHATE SERPL-MCNC: 4.8 MG/DL (ref 2.5–4.9)
PLATELET # BLD AUTO: 39 K/UL (ref 140–450)
PLATELET # BLD AUTO: 57 K/UL (ref 140–450)
POTASSIUM SERPL-SCNC: 3.8 MMOL/L (ref 3.5–5.1)
PROTHROMBIN TIME: 11.5 SECS (ref 10.8–13.4)
RBC # BLD AUTO: 2.74 MIL/UL (ref 4.2–5.4)
RBC # BLD AUTO: 2.83 MIL/UL (ref 4.2–5.4)
SODIUM SERPL-SCNC: 133 MMOL/L (ref 136–145)
WBC # BLD AUTO: 4.1 K/UL (ref 4.8–10.8)
WBC # BLD AUTO: 4.5 K/UL (ref 4.8–10.8)

## 2020-06-26 RX ADMIN — RIFAXIMIN SCH MG: 550 TABLET ORAL at 21:12

## 2020-06-26 RX ADMIN — POLYETHYLENE GLYCOL 3350 SCH GM: 17 POWDER, FOR SOLUTION ORAL at 13:50

## 2020-06-26 RX ADMIN — Medication SCH DEV: at 06:11

## 2020-06-26 RX ADMIN — POLYETHYLENE GLYCOL 3350 SCH GM: 17 POWDER, FOR SOLUTION ORAL at 08:43

## 2020-06-26 RX ADMIN — CALCIUM CARBONATE-VITAMIN D TAB 500 MG-200 UNIT SCH TAB: 500-200 TAB at 21:11

## 2020-06-26 RX ADMIN — IPRATROPIUM BROMIDE AND ALBUTEROL SULFATE SCH ML: .5; 3 SOLUTION RESPIRATORY (INHALATION) at 13:45

## 2020-06-26 RX ADMIN — Medication SCH DEV: at 12:14

## 2020-06-26 RX ADMIN — LACTULOSE SCH GM: 10 SOLUTION ORAL at 13:50

## 2020-06-26 RX ADMIN — ACETAMINOPHEN PRN MG: 325 TABLET ORAL at 22:13

## 2020-06-26 RX ADMIN — SENNOSIDES A AND B SCH MG: 8.6 TABLET, FILM COATED ORAL at 12:20

## 2020-06-26 RX ADMIN — IPRATROPIUM BROMIDE AND ALBUTEROL SULFATE SCH ML: .5; 3 SOLUTION RESPIRATORY (INHALATION) at 07:50

## 2020-06-26 RX ADMIN — SEVELAMER CARBONATE SCH MG: 800 TABLET, FILM COATED ORAL at 08:43

## 2020-06-26 RX ADMIN — LEVOTHYROXINE SODIUM SCH MG: 75 TABLET ORAL at 06:10

## 2020-06-26 RX ADMIN — SODIUM CHLORIDE SCH MLS/HR: 0.9 INJECTION, SOLUTION INTRAVENOUS at 12:43

## 2020-06-26 RX ADMIN — GABAPENTIN SCH MG: 100 CAPSULE ORAL at 21:12

## 2020-06-26 RX ADMIN — SEVELAMER CARBONATE SCH MG: 800 TABLET, FILM COATED ORAL at 12:20

## 2020-06-26 RX ADMIN — LANSOPRAZOLE SCH MG: 30 CAPSULE, DELAYED RELEASE ORAL at 06:10

## 2020-06-26 RX ADMIN — PROPRANOLOL HYDROCHLORIDE SCH MG: 20 TABLET ORAL at 13:00

## 2020-06-26 RX ADMIN — CALCIUM CARBONATE-VITAMIN D TAB 500 MG-200 UNIT SCH TAB: 500-200 TAB at 08:44

## 2020-06-26 RX ADMIN — LACTULOSE SCH GM: 10 SOLUTION ORAL at 08:43

## 2020-06-26 RX ADMIN — PROPRANOLOL HYDROCHLORIDE SCH MG: 20 TABLET ORAL at 16:49

## 2020-06-26 RX ADMIN — POLYETHYLENE GLYCOL 3350 SCH GM: 17 POWDER, FOR SOLUTION ORAL at 21:12

## 2020-06-26 RX ADMIN — ACETAMINOPHEN PRN MG: 325 TABLET ORAL at 10:58

## 2020-06-26 RX ADMIN — RIFAXIMIN SCH MG: 550 TABLET ORAL at 08:44

## 2020-06-26 RX ADMIN — INSULIN LISPRO PRN UNITS: 100 INJECTION, SOLUTION INTRAVENOUS; SUBCUTANEOUS at 21:20

## 2020-06-26 RX ADMIN — POLYETHYLENE GLYCOL 3350 SCH GM: 17 POWDER, FOR SOLUTION ORAL at 17:29

## 2020-06-26 RX ADMIN — Medication SCH DEV: at 21:20

## 2020-06-26 RX ADMIN — INSULIN LISPRO PRN UNITS: 100 INJECTION, SOLUTION INTRAVENOUS; SUBCUTANEOUS at 17:30

## 2020-06-26 RX ADMIN — Medication SCH DEV: at 17:29

## 2020-06-26 RX ADMIN — PROPRANOLOL HYDROCHLORIDE SCH MG: 20 TABLET ORAL at 08:43

## 2020-06-26 RX ADMIN — FERROUS SULFATE TAB 325 MG (65 MG ELEMENTAL FE) SCH MG: 325 (65 FE) TAB at 21:12

## 2020-06-26 RX ADMIN — IPRATROPIUM BROMIDE AND ALBUTEROL SULFATE SCH ML: .5; 3 SOLUTION RESPIRATORY (INHALATION) at 19:37

## 2020-06-26 RX ADMIN — INSULIN GLARGINE SCH UNITS: 100 INJECTION, SOLUTION SUBCUTANEOUS at 21:17

## 2020-06-26 RX ADMIN — SENNOSIDES A AND B SCH MG: 8.6 TABLET, FILM COATED ORAL at 08:44

## 2020-06-26 RX ADMIN — SEVELAMER CARBONATE SCH MG: 800 TABLET, FILM COATED ORAL at 17:29

## 2020-06-26 RX ADMIN — LACTULOSE SCH GM: 10 SOLUTION ORAL at 17:29

## 2020-06-26 RX ADMIN — Medication SCH TAB: at 08:44

## 2020-06-26 RX ADMIN — SENNOSIDES A AND B SCH MG: 8.6 TABLET, FILM COATED ORAL at 17:29

## 2020-06-26 RX ADMIN — FERROUS SULFATE TAB 325 MG (65 MG ELEMENTAL FE) SCH MG: 325 (65 FE) TAB at 08:43

## 2020-06-26 NOTE — NUR
RECEIVED BEDSIDE REPORT FROM DAY SHIFT NURSE. PATIENT IS AWAKE AND COOPERATIVE. RESPIRATION 
EVEN UNLABORED ON ROOM AIR. NO DISTRESS NOTED. SKIN IS WARM AND DRY. RIGHT UPPER ARM MIDLINE 
NOTED. LEFT AV SHUNT NOTED. PLAN OF CARE WAS DISCUSSED. ALL SAFETY MEASURES IN PLACE. BED IS 
AT LOW POSITION. CALL LIGHT WITHIN REACH AND VERBALIZES ITS USE. WILL CONTINUE TO MONITOR.

## 2020-06-26 NOTE — NUR
REPORT RECEIVED FROM [facility] NURSE. PT IS ALERT AND ORIENTED. PT HAS MIDLINE ACCESS ON THE RIGHT 
ARM. FISTULA ON THE LEFT UPPER ARM WITH POSITIVE BRUIT AND THRILL. SR TO SB ON THE MONITOR. 
SKIN WARM, DRY AND INTACT. NO DISTRESS OBSERVED. RESPIRATION EVEN AND UNLABORED. WILL 
CONTINUE TO MONITOR.

## 2020-06-26 NOTE — NUR
6/26/20 RD FOLLOW UP COMPLETED



PLEASE REFER TO NUTRITION ASSESSMENT UNDER CARE ACTIVITY FOR ESTIMATED NUTRITIONAL NEEDS. 



1. CONTINUE CLEAR DIET AS TOLERATED 

2. ADVANCE TO [facility] 60GM RENAL PUREE WHEN MEDICALLY CLEARED

3. RECOMMEND NEPRO BID 

4. RD TO FOLLOW-UP 2-3 DAYS, HIGH RISK 



BRYAN LYNN, RD

## 2020-06-26 NOTE — NUR
ENDORSE PATIENT TO [facility] FOR CONTINUITY OF CARE. PATIENT IN STABLE CONDITION.

## 2020-06-26 NOTE — NUR
MIDNIGHT V/S AS FOLLOWS: T 98.4 P 61 R 18 B/P 101/50 02 97% ON 2 LITERS VIA N/C. ALL ORDERED 
PRECAUTIONS IN PLACE. MD MADE AWARE THAT ASSIGNED RN UNABLE TO TRANSFUSE BLOOD DUE TO THE 
INABLITY OF STAFF TO ESTABLISH WORKING IV SITE.

## 2020-06-26 NOTE — NUR
RECEIVED REPORT FROM NIGHT SHIFT RN FOR CONTINUITY OF CARE. PT IS AAOX4, COOPERATIVE AND 
ABLE TO MAKE NEEDS KNOWN. PT ON RA. SKIN INTACT. NO IV ACCESS. PER NIGHT SHIFT, MANY TRIES 
BUT NO SUCCESS. MD AWARE OF PT NEED FOR PICC LINE. DISCUSSED POC WITH PT AND PT VERBALIZED 
UNDERSTANDING. ALL SAFETY MEASURES IN PLACE. BED IN LOW POSITION, CALL LIGHT WITHIN REACH. 
WILL ROUND FREQUENTLY ON PT THROUGHOUT THE SHIFT.

## 2020-06-26 NOTE — NUR
PT HAD 2 LARGE SOFT FORMED BM 'S NO BLEEDING NOTED, PT STILL NOT PREPPED ENOUGH FOR A 
COLONOSCOPY, RESIDENT MD'S MADE AWARE.

## 2020-06-27 VITALS — SYSTOLIC BLOOD PRESSURE: 111 MMHG | DIASTOLIC BLOOD PRESSURE: 46 MMHG

## 2020-06-27 VITALS — DIASTOLIC BLOOD PRESSURE: 52 MMHG | SYSTOLIC BLOOD PRESSURE: 100 MMHG

## 2020-06-27 VITALS — SYSTOLIC BLOOD PRESSURE: 126 MMHG | DIASTOLIC BLOOD PRESSURE: 53 MMHG

## 2020-06-27 VITALS — SYSTOLIC BLOOD PRESSURE: 98 MMHG | DIASTOLIC BLOOD PRESSURE: 48 MMHG

## 2020-06-27 VITALS — DIASTOLIC BLOOD PRESSURE: 44 MMHG | SYSTOLIC BLOOD PRESSURE: 96 MMHG

## 2020-06-27 VITALS — DIASTOLIC BLOOD PRESSURE: 50 MMHG | SYSTOLIC BLOOD PRESSURE: 99 MMHG

## 2020-06-27 LAB
ANION GAP SERPL CALCULATED.3IONS-SCNC: 15.3 MMOL/L (ref 8–16)
ANION GAP SERPL CALCULATED.3IONS-SCNC: 16 MMOL/L (ref 8–16)
BASOPHILS # BLD AUTO: 0.1 K/UL (ref 0–0.22)
BASOPHILS # BLD AUTO: 0.1 K/UL (ref 0–0.22)
BASOPHILS NFR BLD AUTO: 1.1 % (ref 0–2)
BASOPHILS NFR BLD AUTO: 1.9 % (ref 0–2)
BUN SERPL-MCNC: 34 MG/DL (ref 7–18)
BUN SERPL-MCNC: 40 MG/DL (ref 7–18)
CHLORIDE SERPL-SCNC: 98 MMOL/L (ref 98–107)
CHLORIDE SERPL-SCNC: 98 MMOL/L (ref 98–107)
CO2 SERPL-SCNC: 24.5 MMOL/L (ref 21–32)
CO2 SERPL-SCNC: 25.2 MMOL/L (ref 21–32)
CREAT SERPL-MCNC: 4.1 MG/DL (ref 0.6–1.3)
CREAT SERPL-MCNC: 4.3 MG/DL (ref 0.6–1.3)
EOSINOPHIL # BLD AUTO: 0.1 K/UL (ref 0–0.4)
EOSINOPHIL # BLD AUTO: 0.4 K/UL (ref 0–0.4)
EOSINOPHIL NFR BLD AUTO: 3.7 % (ref 0–4)
EOSINOPHIL NFR BLD AUTO: 6.9 % (ref 0–4)
ERYTHROCYTE [DISTWIDTH] IN BLOOD BY AUTOMATED COUNT: 16.3 % (ref 11.6–13.7)
ERYTHROCYTE [DISTWIDTH] IN BLOOD BY AUTOMATED COUNT: 17.1 % (ref 11.6–13.7)
GFR SERPL CREATININE-BSD FRML MDRD: 13 ML/MIN (ref 90–?)
GFR SERPL CREATININE-BSD FRML MDRD: 14 ML/MIN (ref 90–?)
GLUCOSE SERPL-MCNC: 297 MG/DL (ref 74–106)
GLUCOSE SERPL-MCNC: 36 MG/DL (ref 74–106)
HCT VFR BLD AUTO: 29.6 % (ref 36–48)
HCT VFR BLD AUTO: 30.8 % (ref 36–48)
HGB BLD-MCNC: 10.3 G/DL (ref 12–16)
HGB BLD-MCNC: 9.7 G/DL (ref 12–16)
LYMPHOCYTES # BLD AUTO: 0.2 K/UL (ref 2.5–16.5)
LYMPHOCYTES # BLD AUTO: 0.3 K/UL (ref 2.5–16.5)
LYMPHOCYTES NFR BLD AUTO: 4.9 % (ref 20.5–51.1)
LYMPHOCYTES NFR BLD AUTO: 5.8 % (ref 20.5–51.1)
MAGNESIUM SERPL-MCNC: 2.1 MG/DL (ref 1.8–2.4)
MCH RBC QN AUTO: 30 PG (ref 27–31)
MCH RBC QN AUTO: 31 PG (ref 27–31)
MCHC RBC AUTO-ENTMCNC: 33 G/DL (ref 33–37)
MCHC RBC AUTO-ENTMCNC: 34 G/DL (ref 33–37)
MCV RBC AUTO: 91 FL (ref 80–94)
MCV RBC AUTO: 91.6 FL (ref 80–94)
MONOCYTES # BLD AUTO: 0.3 K/UL (ref 0.8–1)
MONOCYTES # BLD AUTO: 0.6 K/UL (ref 0.8–1)
MONOCYTES NFR BLD AUTO: 10.2 % (ref 1.7–9.3)
MONOCYTES NFR BLD AUTO: 7.6 % (ref 1.7–9.3)
NEUTROPHILS # BLD AUTO: 3 K/UL (ref 1.8–7.7)
NEUTROPHILS # BLD AUTO: 4.5 K/UL (ref 1.8–7.7)
NEUTROPHILS NFR BLD AUTO: 76.9 % (ref 42.2–75.2)
NEUTROPHILS NFR BLD AUTO: 81 % (ref 42.2–75.2)
PHOSPHATE SERPL-MCNC: 4.6 MG/DL (ref 2.5–4.9)
PLATELET # BLD AUTO: 37 K/UL (ref 140–450)
PLATELET # BLD AUTO: 65 K/UL (ref 140–450)
POTASSIUM SERPL-SCNC: 3.2 MMOL/L (ref 3.5–5.1)
POTASSIUM SERPL-SCNC: 3.8 MMOL/L (ref 3.5–5.1)
RBC # BLD AUTO: 3.23 MIL/UL (ref 4.2–5.4)
RBC # BLD AUTO: 3.39 MIL/UL (ref 4.2–5.4)
SODIUM SERPL-SCNC: 134 MMOL/L (ref 136–145)
SODIUM SERPL-SCNC: 136 MMOL/L (ref 136–145)
WBC # BLD AUTO: 3.7 K/UL (ref 4.8–10.8)
WBC # BLD AUTO: 5.8 K/UL (ref 4.8–10.8)

## 2020-06-27 PROCEDURE — 0DJD8ZZ INSPECTION OF LOWER INTESTINAL TRACT, VIA NATURAL OR ARTIFICIAL OPENING ENDOSCOPIC: ICD-10-PCS

## 2020-06-27 RX ADMIN — FERROUS SULFATE TAB 325 MG (65 MG ELEMENTAL FE) SCH MG: 325 (65 FE) TAB at 09:00

## 2020-06-27 RX ADMIN — Medication SCH DEV: at 11:30

## 2020-06-27 RX ADMIN — CALCIUM CARBONATE-VITAMIN D TAB 500 MG-200 UNIT SCH TAB: 500-200 TAB at 09:00

## 2020-06-27 RX ADMIN — PROPRANOLOL HYDROCHLORIDE SCH MG: 20 TABLET ORAL at 10:40

## 2020-06-27 RX ADMIN — PROPRANOLOL HYDROCHLORIDE SCH MG: 20 TABLET ORAL at 21:00

## 2020-06-27 RX ADMIN — LACTULOSE SCH GM: 10 SOLUTION ORAL at 10:10

## 2020-06-27 RX ADMIN — LACTULOSE SCH GM: 10 SOLUTION ORAL at 21:23

## 2020-06-27 RX ADMIN — INSULIN GLARGINE SCH UNITS: 100 INJECTION, SOLUTION SUBCUTANEOUS at 20:16

## 2020-06-27 RX ADMIN — Medication SCH DEV: at 08:40

## 2020-06-27 RX ADMIN — Medication SCH DEV: at 21:29

## 2020-06-27 RX ADMIN — LEVOTHYROXINE SODIUM SCH MG: 75 TABLET ORAL at 06:05

## 2020-06-27 RX ADMIN — IPRATROPIUM BROMIDE AND ALBUTEROL SULFATE SCH ML: .5; 3 SOLUTION RESPIRATORY (INHALATION) at 08:29

## 2020-06-27 RX ADMIN — RIFAXIMIN SCH MG: 550 TABLET ORAL at 21:22

## 2020-06-27 RX ADMIN — CALCIUM CARBONATE-VITAMIN D TAB 500 MG-200 UNIT SCH TAB: 500-200 TAB at 21:22

## 2020-06-27 RX ADMIN — ACETAMINOPHEN PRN MG: 325 TABLET ORAL at 14:58

## 2020-06-27 RX ADMIN — Medication SCH DEV: at 17:00

## 2020-06-27 RX ADMIN — FERROUS SULFATE TAB 325 MG (65 MG ELEMENTAL FE) SCH MG: 325 (65 FE) TAB at 21:22

## 2020-06-27 RX ADMIN — SEVELAMER CARBONATE SCH MG: 800 TABLET, FILM COATED ORAL at 14:59

## 2020-06-27 RX ADMIN — IPRATROPIUM BROMIDE AND ALBUTEROL SULFATE SCH ML: .5; 3 SOLUTION RESPIRATORY (INHALATION) at 21:04

## 2020-06-27 RX ADMIN — IPRATROPIUM BROMIDE AND ALBUTEROL SULFATE SCH ML: .5; 3 SOLUTION RESPIRATORY (INHALATION) at 14:08

## 2020-06-27 RX ADMIN — INSULIN LISPRO PRN UNITS: 100 INJECTION, SOLUTION INTRAVENOUS; SUBCUTANEOUS at 18:03

## 2020-06-27 RX ADMIN — INSULIN LISPRO PRN UNITS: 100 INJECTION, SOLUTION INTRAVENOUS; SUBCUTANEOUS at 20:18

## 2020-06-27 RX ADMIN — Medication SCH TAB: at 09:00

## 2020-06-27 RX ADMIN — RIFAXIMIN SCH MG: 550 TABLET ORAL at 09:00

## 2020-06-27 RX ADMIN — SEVELAMER CARBONATE SCH MG: 800 TABLET, FILM COATED ORAL at 08:00

## 2020-06-27 RX ADMIN — LANSOPRAZOLE SCH MG: 30 CAPSULE, DELAYED RELEASE ORAL at 06:05

## 2020-06-27 RX ADMIN — SEVELAMER CARBONATE SCH MG: 800 TABLET, FILM COATED ORAL at 17:57

## 2020-06-27 NOTE — NUR
PT BLOOD GLUCOSE 25 AND 20 FOR THE REPEAT CHECK. ABBOJECT D50% GIVEN AS ORDERED FOR BLOOD 
GLUCOSE LESS THAN 70. WILL RECHECK AFTER 15 MINS.

## 2020-06-27 NOTE — NUR
RECEIVED PATIENT IN STABLE CONDITION FROM AM SHIFT NURSE FOR CONTINUITY OF CARE. PATIENT 
EATING AT THIS TIME. RESPIRATIONS EVEN, UNLABORED. SKIN WARM, DRY. RIGHT UPPER ARM MIDLINE 
PATENT/INTACT. LEFT ARM FISTULA FOR DIALYSIS NOTED. NO C/O PAIN. NO S/S ACUTE DISTRESS. 
SAFETY PRECAUTIONS IN PLACE. CALL LIGHT WITHIN REACH.

## 2020-06-27 NOTE — NUR
PT IS CURRENTLY RECEIVING A BREATHING TREATMENT AND TOLERATING WELL. PT DOES NOT COMPLAIN OF 
ANY PAIN AT THIS TIME. SAFETY MEASURES IN PLACE AND WILL CONTINUE TO MONITOR.

## 2020-06-27 NOTE — NUR
PT HAS RECEIVED LUNCH TRAY AND IS EATING AT BEDSIDE WITH NO SIGNS OF DISTRESS. PT STATED 
THAT SHE WANTS TO FEED HERSELF. SAFETY MEASURES IN PLACE AND WILL CONTINUE TO MONITOR.

## 2020-06-27 NOTE — NUR
RECEIVED PATIENT FROM NIGHT SHIFT NURSE. PT IS CURRENTLY LAYING IN BED ASLEEP WITH NO SIGNS 
OF DISTRESS AT THIS TIME. RESPIRATIONS ARE EVEN AND UNLABORED ON ROOM AIR WITH NO 
DIFFICULTIES BREATHING. SKIN IS INTACT WITH IV ASYMPTOMATIC, PATENT. BED IS IN LOW 
SEMI-FOWLERS POSITION, SAFETY MEASURES IN PLACE AND WILL CONTINUE TO MONITOR.

## 2020-06-27 NOTE — NUR
PATIENT RESTING COMFORTABLY IN BED. NO C/O PAIN. NO S/S ACUTE DISTRESS. CALL LIGHT WITHIN 
REACH. SAFETY PRECAUTIONS IN PLACE.

## 2020-06-27 NOTE — NUR
PT HAS BEEN BROUGHT BACK TO UNIT. COLONOSCOPY HAS BEEN COMPLETED AND FURTHER PLAN OF CARE 
HAS BEEN DISCUSSED WITH FAMILY. PT IS CURRENTLY SLEEPING BUT IS EASILY ABLE TO BE AROUSED. 
SAFETY MEASURES IN PLACE AND WILL CONTINUE TO MONITOR.

## 2020-06-27 NOTE — NUR
PT IS CURRENTLY EATING DINNER AT BEDSIDE WITH NO SIGNS OF DISTRESS AT THIS TIME. SAFETY 
MEASURES IN PLACE AND WILL CONTINUE TO MONITOR PLAN OF CARE, WILL ENDORSE TO NIGHT SHIFT 
NURSE FOR CONTINUITY OF CARE.

## 2020-06-27 NOTE — NUR
PTS BLOOD GLUCOSE IS CURRENTLY 93 THEREFORE NO INSULIN COVERAGE IS NEEDED AT THIS TIME. PT 
IS CURRENTLY BEING CHANGED AND TOLERATING CHANGE WELL. NO SIGNS OF DISTRESS OR COMPLAINTS OF 
PAIN AT THIS TIME. SAFETY MEASURES IN PLACE AND WILL CONTINUE TO MONITOR.

## 2020-06-27 NOTE — NUR
ADMINISTERED MEDICATIONS AS PER ORDER AND TOLERATED WELL. ADMINISTERED INSULIN AS PER ORDER 
PARAMETERS. PT IS CURRENTLY SLEEPING AND DOES NOT WANT TO BE BOTHERED AT THIS TIME. SAFETY 
MEASURES IN PLACE AND WILL CONTINUE TO MONITOR.

## 2020-06-27 NOTE — NUR
PT HAD INTERMITTENT CONFUSION. PT WAS TRYING TO MOVED OUT OF BED BY HERSELF. CALLED DR. ESTES FOR RESTRAINT ODER FOR SAFETY PRECAUTIONS. SOFT WRIST APPLIED TO BILATERAL WRIST. WILL 
CONTINUE TO MONITOR.

## 2020-06-27 NOTE — NUR
ADMINISTERED MEDICATIONS AS PER ORDER AND TOLERATED WELL. PT STATES THAT WANTS HER MIDLINE 
TAKEN OUT BECAUSE SHE IS IN PAIN. ADMINISTERED TYLENOL FOR PAIN IN ARM AS REQUESTED. SAFETY 
MEASURES IN PLACE AND WILL CONTINUE TO MONITOR.

## 2020-06-27 NOTE — NUR
PT IS CURRENTLY SLEEPING AND DOES NOT WANT TO BE BOTHERED AT THIS TIME. SAFETY MEASURES IN 
PLACE AND WILL CONTINUE TO MONITOR.

## 2020-06-27 NOTE — NUR
PATIENT AWAKE AND IN STABLE CONDITION. NO C/O PAIN. NO S/S ACUTE DISTRESS. CALL LIGHT WITHIN 
REACH. SAFETY PRECAUTIONS IN PLACE.

## 2020-06-27 NOTE — NUR
OR NURSE ABARHAM CAME TO TAKE PATIENT TO COLONOSCOPY. PT STATES THAT SHE IS READY TO GO AND HAS 
NO COMPLAINTS AT THIS TIME OR SHOW SIGNS OF DISTRESS.

## 2020-06-28 VITALS — SYSTOLIC BLOOD PRESSURE: 139 MMHG | DIASTOLIC BLOOD PRESSURE: 68 MMHG

## 2020-06-28 VITALS — SYSTOLIC BLOOD PRESSURE: 129 MMHG | DIASTOLIC BLOOD PRESSURE: 49 MMHG

## 2020-06-28 VITALS — DIASTOLIC BLOOD PRESSURE: 88 MMHG | SYSTOLIC BLOOD PRESSURE: 123 MMHG

## 2020-06-28 VITALS — SYSTOLIC BLOOD PRESSURE: 128 MMHG | DIASTOLIC BLOOD PRESSURE: 68 MMHG

## 2020-06-28 VITALS — SYSTOLIC BLOOD PRESSURE: 115 MMHG | DIASTOLIC BLOOD PRESSURE: 33 MMHG

## 2020-06-28 VITALS — SYSTOLIC BLOOD PRESSURE: 105 MMHG | DIASTOLIC BLOOD PRESSURE: 39 MMHG

## 2020-06-28 PROCEDURE — 05HY33Z INSERTION OF INFUSION DEVICE INTO UPPER VEIN, PERCUTANEOUS APPROACH: ICD-10-PCS | Performed by: GENERAL PRACTICE

## 2020-06-28 RX ADMIN — Medication SCH TAB: at 08:15

## 2020-06-28 RX ADMIN — Medication SCH DEV: at 06:57

## 2020-06-28 RX ADMIN — SEVELAMER CARBONATE SCH MG: 800 TABLET, FILM COATED ORAL at 08:12

## 2020-06-28 RX ADMIN — PROPRANOLOL HYDROCHLORIDE SCH MG: 20 TABLET ORAL at 08:11

## 2020-06-28 RX ADMIN — LACTULOSE SCH GM: 10 SOLUTION ORAL at 08:13

## 2020-06-28 RX ADMIN — PROPRANOLOL HYDROCHLORIDE SCH MG: 20 TABLET ORAL at 22:00

## 2020-06-28 RX ADMIN — LANSOPRAZOLE SCH MG: 30 CAPSULE, DELAYED RELEASE ORAL at 06:24

## 2020-06-28 RX ADMIN — FERROUS SULFATE TAB 325 MG (65 MG ELEMENTAL FE) SCH MG: 325 (65 FE) TAB at 08:14

## 2020-06-28 RX ADMIN — RIFAXIMIN SCH MG: 550 TABLET ORAL at 21:35

## 2020-06-28 RX ADMIN — Medication SCH DEV: at 21:00

## 2020-06-28 RX ADMIN — CALCIUM CARBONATE-VITAMIN D TAB 500 MG-200 UNIT SCH TAB: 500-200 TAB at 21:35

## 2020-06-28 RX ADMIN — INSULIN GLARGINE SCH UNITS: 100 INJECTION, SOLUTION SUBCUTANEOUS at 22:39

## 2020-06-28 RX ADMIN — LEVOTHYROXINE SODIUM SCH MG: 75 TABLET ORAL at 06:24

## 2020-06-28 RX ADMIN — LACTULOSE SCH GM: 10 SOLUTION ORAL at 09:00

## 2020-06-28 RX ADMIN — PROPRANOLOL HYDROCHLORIDE SCH MG: 20 TABLET ORAL at 21:36

## 2020-06-28 RX ADMIN — Medication SCH DEV: at 15:57

## 2020-06-28 RX ADMIN — SEVELAMER CARBONATE SCH MG: 800 TABLET, FILM COATED ORAL at 16:00

## 2020-06-28 RX ADMIN — FERROUS SULFATE TAB 325 MG (65 MG ELEMENTAL FE) SCH MG: 325 (65 FE) TAB at 21:36

## 2020-06-28 RX ADMIN — Medication SCH DEV: at 11:30

## 2020-06-28 RX ADMIN — LACTULOSE SCH GM: 10 SOLUTION ORAL at 21:35

## 2020-06-28 RX ADMIN — CALCIUM CARBONATE-VITAMIN D TAB 500 MG-200 UNIT SCH TAB: 500-200 TAB at 08:09

## 2020-06-28 RX ADMIN — RIFAXIMIN SCH MG: 550 TABLET ORAL at 08:13

## 2020-06-28 RX ADMIN — INSULIN LISPRO PRN UNITS: 100 INJECTION, SOLUTION INTRAVENOUS; SUBCUTANEOUS at 22:40

## 2020-06-28 RX ADMIN — SEVELAMER CARBONATE SCH MG: 800 TABLET, FILM COATED ORAL at 12:00

## 2020-06-28 NOTE — NUR
RETURNED TO PATIENT ROOM AND LACTULOSE DOSE IS STILL NOT TAKEN. PATIENT IS NON-COMPLIANT IN 
DRINKING THE MEDICATION, EXPLAINED RISK AND BENEFIT TO THE PATIENT BUT PATIENT STILL REFUSES 
TO TAKE LACTULOSE STATING INCREASED IN DIARRHEA. WILL UNDO ADMINISTRATION AND RETURN 
LACTULOSE DOSE. WILL CONTINUE TO MONITOR.

## 2020-06-28 NOTE — NUR
MORNING MEDICATIONS GIVEN. NO SIGNS OF DISTRESS NOTED. PATIENT INSISTS TO DRINK LACTULOSE 
AFTER BREAKFAST. V/S TAKEN AND WNL. WILL CONTINUE TO MONITOR.

## 2020-06-28 NOTE — NUR
RECEIVED REPORT FROM AM SHIFT. PT SEEN AND ASSESSED. FOUND PT ON ROOM AIR WITH SPO2 OF 98%. 
DIMINISHED BREATH SOUNDS ON AUSCULTATION. NOTICED ADEQUATE BILATERAL CHEST RISE AND AND 
FALL. PT IS IN NO APPARENT RESPIRATORY DISTRESS AT THIS TIME AND NO C/O SOB. PRN TX NO 
INDICATED AT THIS TIME. WILL CONTINUE TO MONITOR PT.

## 2020-06-28 NOTE — NUR
NO INSULIN COVERAGE NEEDED FOR BLOOD GLUCOSE . ATTEMPTED TO GIVEN AFTERNOON 
MEDICATION, BUT PATIENT CONTINUES TO REFUSE STATING SHE ONLY NEEDS TO DRINK WATER TO GET 
BETTER. PATIENT VERBALIZES NO PAIN. V/S TAKEN AND IS WNL. WILL CONTINUE TO MONITOR.

## 2020-06-28 NOTE — NUR
AWAKE AND ALERT VERBALLY RESPONSIVE STABLE GOOD CHEST RISE BREATHS SOUNDS CLEAR BILATERAL 
SATURATION 100% ON SUPPLEMENTAL OXYGEN AT 2 LPM VIA NC TITRATED FIO2 TO ROOM AIR RCP TO 
MONITOR AND NOTIFY ADELAIDA/RICHY

## 2020-06-28 NOTE — NUR
RECEIVED PATIENT IN STABLE CONDITION FROM NIGHT SHIFT RN, SKYLAR, FOR CONTINUITY OF CARE. 
PATIENT AWAKE AND IN BED. RESPIRATIONS EVEN, UNLABORED. SKIN WARM, DRY. RIGHT UPPER ARM 
MIDLINE PATENT/INTACT AND ON SL. LEFT ARM FISTULA FOR DIALYSIS NOTED. NO C/O PAIN. NO S/S 
ACUTE DISTRESS. SAFETY PRECAUTIONS IN PLACE. POC DISCUSSED. CALL LIGHT WITHIN REACH. WILL 
CONTINUE TO MONITOR.

## 2020-06-28 NOTE — NUR
PATIENT'S BLOOD PRESSURE /33. MD INFORMED WITH ORDER FOR NS BOLUS 250 ML. 
ADMINISTERED AS ORDERED. BP /49. PATIENT IN NO DISTRESS.

## 2020-06-28 NOTE — NUR
PATIENT COMPLAINS OF SHORTNESS OF BREATH, SAO2 %. PLACED PATIENT TO 2L O2 FOR COMFORT, 
NO SIGNS OF DISTRESS NOTED. WILL CONTINUE TO MONITOR.

## 2020-06-28 NOTE — NUR
TRUPTI OLIVAS, BY THE BEDSIDE FOR TRANSLATION. PATIENT REFUSES TO TAKE ANY MEDICATIONS TO 
IMPROVE CHEST PAIN. CLAIMS TO HAVE TROUBLE BREATHING, SAO2 AT 99%, PATIENT WANTS TO HAVE O2 
PLACED. EXPLAINED RISKS OF PLACING OXYGEN, PATIENT VERBALIZES UNDERSTANDING AND WISHES US TO 
LEAVE HER INSTEAD. WILL CONTINUE TO MONITOR.

## 2020-06-29 VITALS — SYSTOLIC BLOOD PRESSURE: 113 MMHG | DIASTOLIC BLOOD PRESSURE: 64 MMHG

## 2020-06-29 VITALS — DIASTOLIC BLOOD PRESSURE: 93 MMHG | SYSTOLIC BLOOD PRESSURE: 130 MMHG

## 2020-06-29 VITALS — DIASTOLIC BLOOD PRESSURE: 92 MMHG | SYSTOLIC BLOOD PRESSURE: 131 MMHG

## 2020-06-29 VITALS — DIASTOLIC BLOOD PRESSURE: 64 MMHG | SYSTOLIC BLOOD PRESSURE: 113 MMHG

## 2020-06-29 RX ADMIN — FERROUS SULFATE TAB 325 MG (65 MG ELEMENTAL FE) SCH MG: 325 (65 FE) TAB at 09:00

## 2020-06-29 RX ADMIN — Medication SCH DEV: at 06:17

## 2020-06-29 RX ADMIN — SEVELAMER CARBONATE SCH MG: 800 TABLET, FILM COATED ORAL at 08:00

## 2020-06-29 RX ADMIN — LEVOTHYROXINE SODIUM SCH MG: 75 TABLET ORAL at 06:13

## 2020-06-29 RX ADMIN — CALCIUM CARBONATE-VITAMIN D TAB 500 MG-200 UNIT SCH TAB: 500-200 TAB at 09:00

## 2020-06-29 RX ADMIN — LACTULOSE SCH GM: 10 SOLUTION ORAL at 09:00

## 2020-06-29 RX ADMIN — RIFAXIMIN SCH MG: 550 TABLET ORAL at 09:00

## 2020-06-29 RX ADMIN — LANSOPRAZOLE SCH MG: 30 CAPSULE, DELAYED RELEASE ORAL at 06:13

## 2020-06-29 RX ADMIN — Medication SCH DEV: at 11:50

## 2020-06-29 RX ADMIN — Medication SCH TAB: at 09:00

## 2020-06-29 RX ADMIN — SEVELAMER CARBONATE SCH MG: 800 TABLET, FILM COATED ORAL at 12:00

## 2020-06-29 RX ADMIN — PROPRANOLOL HYDROCHLORIDE SCH MG: 20 TABLET ORAL at 09:00

## 2020-06-29 NOTE — NUR
DR. ESTES TRIED TO EXPLAIN TO PT AT BEDSIDE RE; BLOOD DRAW W/ NADIA SPEAKER AT BEDSIDE TO 
EXPLAIN TO PT. INSTEAD PATIENT SAID BAD WORDS" YOU MAY GO TO HELL" IN German. SHE SAID SHE 
WANTS TO TALK TO HER PRIMARY PHYSICIAN.

## 2020-06-29 NOTE — NUR
PT IS FOR DISCHARGE; PT AWAKE, ALERT ORIENTED X 4; AMBULATORY W/ STANBY ASSIST. Iranian 
SLEAKING; DR. ESTES SAID TO LEAVE IT FOR NOW, RIGHT MIDLINE (R UPPER ARM) AND CHECK W/ AM 
SHIFT. WILL ENDORSE TO NEX SHIFT

## 2020-06-29 NOTE — NUR
PT SIGNED DISCHARGE PAPERWORK. MIDLINE REMOVED AS PER DR. DUNLAP/KELSEY. PT TOLERATED WELL. 
REINFORCED WITH GAUZE AND KIRLIX. ID BANDS REMOVED. ASSISTED TO CHANGE INTO CLOTHES AND 
GATHERED ALL PERSONAL BELONGINGS. TOOK PT VIA WHEELCHAIR TO FRONT LOBBY WHERE DAUGHTER WAS 
WAITING FOR HER. PT IN STABLE CONDITION AT THIS TIME.

## 2020-06-29 NOTE — NUR
RECEIVED REPORT FROM NIGHT SHIFT NURSE CHRISTINA-RN. PT RESTING IN BED, AOX4-Greek SPEAKING, 
ON ROOM AIR WITH RIGHT UPPER ARM MIDLINE DOUBLE LUMEN/SL. DISCUSSED PLAN OF CARE AND PT 
VERBALIZED UNDERSTANDING. PT CURRENTLY REFUSING TREATMENT AND WANTING TO BE DISCHARGED. MD 
AWARE. CALL LIGHT WITHIN REACH. NO S/S OF RESPIRATORY DISTRESS OR DISCOMFORT NOTED AT THIS 
TIME. WILL CONTINUE TO MONITOR.

## 2020-06-29 NOTE — NUR
PT REFUSED MEDICATION RENVELA. EDUCATED PT ON MEDICATION AND PT VERBALIZED UNDERSTANDING 
HOWEVER CONTINUES TO REFUSE. CALL LIGHT WITHIN REACH. NO S/S OF RESPIRATORY DISTRESS OR 
DISCOMFORT NOTED AT THIS TIME. WILL CONTINUE TO MONITOR.

## 2020-06-29 NOTE — NUR
BLOOD GLUCOSE 193- INSULIN COVERAGE NEEDED HOWEVER PT REFUSING INSULIN COVERAGE AND WANTING 
TO BE DISCHARGED. CALL LIGHT WITHIN REACH. NO S/S OF RESPIRATORY DISTRESS OR DISCOMFORT 
NOTED AT THIS TIME. WILL CONTINUE TO MONITOR.

## 2020-06-29 NOTE — NUR
PT REFUSING SCHEDULED MEDICATIONS. EDUCATED PT ON EACH AND EVERY MEDICATION HOWEVER PT 
CONTINUES TO REFUSE MEDICATIONS AND WANTING TO BE DISCHARGED. CALL LIGHT WITHIN REACH. NO 
S/S OF RESPIRATORY DISTRESS OR DISCOMFORT NOTED AT THIS TIME. WILL CONTINUE TO MONITOR.

## 2020-06-29 NOTE — NUR
PHLEBOTOMIST TRIECD TO EXPLAIN PATIENT THAT SHE NEEDS TO  DRAW BLOOD. LAB IS A 
Marshallese SPEAKER. PT REFUSED BLOOD DRAW BECAUSE SHE SAID SHE WANTS TO TALK TO